# Patient Record
Sex: FEMALE | Race: WHITE | Employment: FULL TIME | URBAN - METROPOLITAN AREA
[De-identification: names, ages, dates, MRNs, and addresses within clinical notes are randomized per-mention and may not be internally consistent; named-entity substitution may affect disease eponyms.]

---

## 2017-01-29 ENCOUNTER — OFFICE VISIT (OUTPATIENT)
Dept: FAMILY MEDICINE CLINIC | Facility: CLINIC | Age: 37
End: 2017-01-29

## 2017-01-29 VITALS
OXYGEN SATURATION: 99 % | RESPIRATION RATE: 15 BRPM | HEART RATE: 72 BPM | SYSTOLIC BLOOD PRESSURE: 102 MMHG | TEMPERATURE: 99 F | DIASTOLIC BLOOD PRESSURE: 64 MMHG

## 2017-01-29 DIAGNOSIS — R51.9 SCALP TENDERNESS: Primary | ICD-10-CM

## 2017-01-29 PROCEDURE — 99213 OFFICE O/P EST LOW 20 MIN: CPT | Performed by: PHYSICIAN ASSISTANT

## 2017-01-29 NOTE — PROGRESS NOTES
CHIEF COMPLAINT:     Patient presents with: Other: bump on back of  head. HPI:   Donald Daniel is a 29year old female who presents with complaints of tenderness and palpable bumps left occipital scalp for past 3 days.    She is concerned about poss conjunctiva clear, sclera white,  PERRLA  EARS: TM's non erythematous  NOSE: nares patent, mucosa without congestion  THROAT: Posterior pharynx is non erythematous, no PND, no exudates.   NECK: supple, non-tender  LUNGS: clear to auscultation bilaterally wi

## 2017-06-05 ENCOUNTER — TELEPHONE (OUTPATIENT)
Dept: FAMILY MEDICINE CLINIC | Facility: CLINIC | Age: 37
End: 2017-06-05

## 2017-06-05 DIAGNOSIS — Z00.00 LABORATORY EXAM ORDERED AS PART OF ROUTINE GENERAL MEDICAL EXAMINATION: ICD-10-CM

## 2017-06-05 DIAGNOSIS — E78.00 PURE HYPERCHOLESTEROLEMIA: Primary | ICD-10-CM

## 2017-06-20 ENCOUNTER — LAB ENCOUNTER (OUTPATIENT)
Dept: LAB | Age: 37
End: 2017-06-20
Attending: FAMILY MEDICINE
Payer: COMMERCIAL

## 2017-06-20 DIAGNOSIS — Z00.00 LABORATORY EXAM ORDERED AS PART OF ROUTINE GENERAL MEDICAL EXAMINATION: ICD-10-CM

## 2017-06-20 DIAGNOSIS — E78.00 PURE HYPERCHOLESTEROLEMIA: ICD-10-CM

## 2017-06-20 PROCEDURE — 80053 COMPREHEN METABOLIC PANEL: CPT | Performed by: FAMILY MEDICINE

## 2017-06-20 PROCEDURE — 80061 LIPID PANEL: CPT | Performed by: FAMILY MEDICINE

## 2017-06-20 PROCEDURE — 36415 COLL VENOUS BLD VENIPUNCTURE: CPT | Performed by: FAMILY MEDICINE

## 2017-06-20 PROCEDURE — 85025 COMPLETE CBC W/AUTO DIFF WBC: CPT | Performed by: FAMILY MEDICINE

## 2017-06-28 ENCOUNTER — OFFICE VISIT (OUTPATIENT)
Dept: FAMILY MEDICINE CLINIC | Facility: CLINIC | Age: 37
End: 2017-06-28

## 2017-06-28 ENCOUNTER — OFFICE VISIT (OUTPATIENT)
Dept: OBGYN CLINIC | Facility: CLINIC | Age: 37
End: 2017-06-28

## 2017-06-28 ENCOUNTER — APPOINTMENT (OUTPATIENT)
Dept: LAB | Age: 37
End: 2017-06-28
Attending: FAMILY MEDICINE
Payer: COMMERCIAL

## 2017-06-28 VITALS
HEART RATE: 80 BPM | HEIGHT: 68 IN | BODY MASS INDEX: 18.34 KG/M2 | DIASTOLIC BLOOD PRESSURE: 66 MMHG | WEIGHT: 121 LBS | SYSTOLIC BLOOD PRESSURE: 116 MMHG

## 2017-06-28 VITALS
RESPIRATION RATE: 16 BRPM | WEIGHT: 120 LBS | HEIGHT: 68.5 IN | TEMPERATURE: 99 F | DIASTOLIC BLOOD PRESSURE: 60 MMHG | SYSTOLIC BLOOD PRESSURE: 90 MMHG | BODY MASS INDEX: 17.98 KG/M2 | HEART RATE: 72 BPM

## 2017-06-28 DIAGNOSIS — Z00.00 ANNUAL PHYSICAL EXAM: Primary | ICD-10-CM

## 2017-06-28 DIAGNOSIS — L65.9 HAIR THINNING: ICD-10-CM

## 2017-06-28 DIAGNOSIS — K90.41 GLUTEN INTOLERANCE: ICD-10-CM

## 2017-06-28 DIAGNOSIS — N89.8 DISCHARGE FROM THE VAGINA: ICD-10-CM

## 2017-06-28 DIAGNOSIS — Z01.419 ENCOUNTER FOR WELL WOMAN EXAM WITH ROUTINE GYNECOLOGICAL EXAM: Primary | ICD-10-CM

## 2017-06-28 PROCEDURE — 87510 GARDNER VAG DNA DIR PROBE: CPT | Performed by: OBSTETRICS & GYNECOLOGY

## 2017-06-28 PROCEDURE — 88175 CYTOPATH C/V AUTO FLUID REDO: CPT | Performed by: OBSTETRICS & GYNECOLOGY

## 2017-06-28 PROCEDURE — 87660 TRICHOMONAS VAGIN DIR PROBE: CPT | Performed by: OBSTETRICS & GYNECOLOGY

## 2017-06-28 PROCEDURE — 99395 PREV VISIT EST AGE 18-39: CPT | Performed by: OBSTETRICS & GYNECOLOGY

## 2017-06-28 PROCEDURE — 87480 CANDIDA DNA DIR PROBE: CPT | Performed by: OBSTETRICS & GYNECOLOGY

## 2017-06-28 PROCEDURE — 99395 PREV VISIT EST AGE 18-39: CPT | Performed by: FAMILY MEDICINE

## 2017-06-28 NOTE — PROGRESS NOTES
Patient presents with:  Physical: well woman, no pap, sees gyn, pt had lab work completed     HPI:   Terence Quintana is a 29year old female who presents for a complete physical exam.  No specific concerns today.   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 : no. Children: none. Exercise: 5 times a week - cardio. .    Diet: gluten free. Healthy choices. Fruits, vegetables. REVIEW OF SYSTEMS:   All systems reviewed, negative, other than noted above.     EXAM:   BP

## 2017-06-28 NOTE — PROGRESS NOTES
Maryann Sullivan is a 29year old female [place] Patient's last menstrual period was 06/27/2017. Patient presents with:  Wellness Visit  .   C/o increased vaginal discharge  H/o chronic constipation, linzess samples given  OBSTETRICS HISTORY:  OB History   G Outpatient Prescriptions:   •  Multiple Vitamins-Calcium (ONE-A-DAY WOMENS FORMULA OR), Take  by mouth., Disp: , Rfl:     ALLERGIES:    Benzoyl Peroxide            Comment:Other reaction(s): Unknown  Gluten                    Peroxin A [Benzoyl * normal without masses or tenderness  Perineum: normal  Anus: no hemorroids     Assessment & Plan:  Diagnoses and all orders for this visit:    Encounter for well woman exam with routine gynecological exam  -     THINPREP PAP SMEAR B; Future  -     HPV HIGH

## 2017-11-08 ENCOUNTER — NURSE ONLY (OUTPATIENT)
Dept: OBGYN CLINIC | Facility: CLINIC | Age: 37
End: 2017-11-08

## 2017-11-08 DIAGNOSIS — N39.0 URINARY TRACT INFECTION WITHOUT HEMATURIA, SITE UNSPECIFIED: Primary | ICD-10-CM

## 2017-11-08 PROCEDURE — 81002 URINALYSIS NONAUTO W/O SCOPE: CPT | Performed by: OBSTETRICS & GYNECOLOGY

## 2017-11-08 PROCEDURE — 87086 URINE CULTURE/COLONY COUNT: CPT | Performed by: OBSTETRICS & GYNECOLOGY

## 2017-11-08 PROCEDURE — 99212 OFFICE O/P EST SF 10 MIN: CPT | Performed by: OBSTETRICS & GYNECOLOGY

## 2017-11-08 RX ORDER — NITROFURANTOIN 25; 75 MG/1; MG/1
100 CAPSULE ORAL 2 TIMES DAILY
Qty: 14 CAPSULE | Refills: 0 | Status: SHIPPED | OUTPATIENT
Start: 2017-11-08 | End: 2017-11-15

## 2018-05-25 ENCOUNTER — HOSPITAL ENCOUNTER (OUTPATIENT)
Age: 38
Discharge: HOME | End: 2018-05-25
Payer: COMMERCIAL

## 2018-05-25 VITALS
TEMPERATURE: 99 F | HEART RATE: 65 BPM | OXYGEN SATURATION: 100 % | SYSTOLIC BLOOD PRESSURE: 129 MMHG | DIASTOLIC BLOOD PRESSURE: 85 MMHG | RESPIRATION RATE: 16 BRPM

## 2018-05-25 DIAGNOSIS — J06.9 VIRAL UPPER RESPIRATORY ILLNESS: Primary | ICD-10-CM

## 2018-05-25 PROCEDURE — 99204 OFFICE O/P NEW MOD 45 MIN: CPT

## 2018-05-25 PROCEDURE — 99213 OFFICE O/P EST LOW 20 MIN: CPT

## 2018-05-25 RX ORDER — METHYLPREDNISOLONE 4 MG/1
TABLET ORAL
Qty: 1 PACKAGE | Refills: 0 | Status: SHIPPED | OUTPATIENT
Start: 2018-05-25 | End: 2018-07-21

## 2018-05-25 RX ORDER — AZITHROMYCIN 250 MG/1
TABLET, FILM COATED ORAL
Qty: 1 PACKAGE | Refills: 0 | Status: SHIPPED | OUTPATIENT
Start: 2018-05-25 | End: 2018-05-30

## 2018-05-25 RX ORDER — FLUTICASONE PROPIONATE 50 MCG
2 SPRAY, SUSPENSION (ML) NASAL DAILY
Qty: 16 G | Refills: 0 | Status: SHIPPED | OUTPATIENT
Start: 2018-05-25 | End: 2018-06-24

## 2018-05-25 RX ORDER — BENZONATATE 100 MG/1
100 CAPSULE ORAL 3 TIMES DAILY PRN
Qty: 30 CAPSULE | Refills: 0 | Status: SHIPPED | OUTPATIENT
Start: 2018-05-25 | End: 2018-06-24

## 2018-05-26 NOTE — ED PROVIDER NOTES
Patient Seen in: THE [facility]    History   Patient presents with:  Cough/URI  Sore Throat    Stated Complaint: Sore throat, cough x 4days    HPI    Patient is a pleasant 52-year-old female.   4 days prior to arrival, patient had on lymphadenopathy. Eye examination: EOMs are intact, normal conjunctival  ENT: There is fluid noted to the bilateral auditory canals without injection or loss of landmarks. Nasal mucosa within normal limits.   [place] [id number]  [phone]              Medications Prescribed:  Current Discharge Medication List    START taking these medications    benzonatate 100 MG Oral Cap  Take 1 capsule (100 mg total) by mouth 3 (three) times daily as needed for cough.   Qty: 30 capsule

## 2018-05-30 ENCOUNTER — OFFICE VISIT (OUTPATIENT)
Dept: FAMILY MEDICINE CLINIC | Facility: CLINIC | Age: 38
End: 2018-05-30

## 2018-05-30 VITALS
BODY MASS INDEX: 17.73 KG/M2 | SYSTOLIC BLOOD PRESSURE: 110 MMHG | HEIGHT: 68.25 IN | WEIGHT: 117 LBS | TEMPERATURE: 99 F | DIASTOLIC BLOOD PRESSURE: 70 MMHG | RESPIRATION RATE: 16 BRPM | HEART RATE: 80 BPM

## 2018-05-30 DIAGNOSIS — R05.9 COUGH: ICD-10-CM

## 2018-05-30 DIAGNOSIS — J02.9 SORE THROAT: Primary | ICD-10-CM

## 2018-05-30 PROCEDURE — 99213 OFFICE O/P EST LOW 20 MIN: CPT | Performed by: FAMILY MEDICINE

## 2018-05-30 PROCEDURE — 87880 STREP A ASSAY W/OPTIC: CPT | Performed by: FAMILY MEDICINE

## 2018-05-30 RX ORDER — AMOXICILLIN AND CLAVULANATE POTASSIUM 875; 125 MG/1; MG/1
1 TABLET, FILM COATED ORAL 2 TIMES DAILY
Qty: 14 TABLET | Refills: 0 | Status: SHIPPED | OUTPATIENT
Start: 2018-05-30 | End: 2018-06-06

## 2018-05-30 NOTE — PROGRESS NOTES
Patient presents with:  Cough  Sore Throat     HPI:   Jamey Begum is a 40year old female who presents to the office for a week of a tickle in her throat, progressed to sore throat and cough. When symptoms started, she began dayquil, nyquil, soup.

## 2018-07-11 ENCOUNTER — TELEPHONE (OUTPATIENT)
Dept: FAMILY MEDICINE CLINIC | Facility: CLINIC | Age: 38
End: 2018-07-11

## 2018-07-11 DIAGNOSIS — Z00.00 LABORATORY EXAM ORDERED AS PART OF ROUTINE GENERAL MEDICAL EXAMINATION: ICD-10-CM

## 2018-07-11 DIAGNOSIS — E78.00 PURE HYPERCHOLESTEROLEMIA: Primary | ICD-10-CM

## 2018-07-21 ENCOUNTER — OFFICE VISIT (OUTPATIENT)
Dept: OBGYN CLINIC | Facility: CLINIC | Age: 38
End: 2018-07-21
Payer: COMMERCIAL

## 2018-07-21 ENCOUNTER — APPOINTMENT (OUTPATIENT)
Dept: LAB | Age: 38
End: 2018-07-21
Attending: FAMILY MEDICINE
Payer: COMMERCIAL

## 2018-07-21 VITALS
HEIGHT: 68.25 IN | WEIGHT: 118 LBS | SYSTOLIC BLOOD PRESSURE: 104 MMHG | RESPIRATION RATE: 16 BRPM | DIASTOLIC BLOOD PRESSURE: 60 MMHG | BODY MASS INDEX: 17.88 KG/M2 | HEART RATE: 72 BPM

## 2018-07-21 DIAGNOSIS — Z00.00 LABORATORY EXAM ORDERED AS PART OF ROUTINE GENERAL MEDICAL EXAMINATION: ICD-10-CM

## 2018-07-21 DIAGNOSIS — E78.00 PURE HYPERCHOLESTEROLEMIA: ICD-10-CM

## 2018-07-21 DIAGNOSIS — Z01.419 ENCOUNTER FOR WELL WOMAN EXAM WITH ROUTINE GYNECOLOGICAL EXAM: Primary | ICD-10-CM

## 2018-07-21 DIAGNOSIS — Z12.4 CERVICAL CANCER SCREENING: ICD-10-CM

## 2018-07-21 LAB
ALBUMIN SERPL-MCNC: 4 G/DL (ref 3.5–4.8)
ALBUMIN/GLOB SERPL: 1.1 {RATIO} (ref 1–2)
ALP LIVER SERPL-CCNC: 46 U/L (ref 37–98)
ALT SERPL-CCNC: 18 U/L (ref 14–54)
ANION GAP SERPL CALC-SCNC: 7 MMOL/L (ref 0–18)
AST SERPL-CCNC: 15 U/L (ref 15–41)
BILIRUB SERPL-MCNC: 1.2 MG/DL (ref 0.1–2)
BUN BLD-MCNC: 14 MG/DL (ref 8–20)
BUN/CREAT SERPL: 16.7 (ref 10–20)
CALCIUM BLD-MCNC: 9 MG/DL (ref 8.3–10.3)
CHLORIDE SERPL-SCNC: 106 MMOL/L (ref 101–111)
CHOLEST SMN-MCNC: 200 MG/DL (ref ?–200)
CO2 SERPL-SCNC: 27 MMOL/L (ref 22–32)
CREAT BLD-MCNC: 0.84 MG/DL (ref 0.55–1.02)
ERYTHROCYTE [DISTWIDTH] IN BLOOD BY AUTOMATED COUNT: 12.5 % (ref 11.5–16)
GLOBULIN PLAS-MCNC: 3.5 G/DL (ref 2.5–3.7)
GLUCOSE BLD-MCNC: 90 MG/DL (ref 70–99)
HCT VFR BLD AUTO: 41.5 % (ref 34–50)
HDLC SERPL-MCNC: 82 MG/DL (ref 45–?)
HDLC SERPL: 2.44 {RATIO} (ref ?–4.44)
HGB BLD-MCNC: 14 G/DL (ref 12–16)
LDLC SERPL CALC-MCNC: 102 MG/DL (ref ?–130)
M PROTEIN MFR SERPL ELPH: 7.5 G/DL (ref 6.1–8.3)
MCH RBC QN AUTO: 32.1 PG (ref 27–33.2)
MCHC RBC AUTO-ENTMCNC: 33.7 G/DL (ref 31–37)
MCV RBC AUTO: 95.2 FL (ref 81–100)
NONHDLC SERPL-MCNC: 118 MG/DL (ref ?–130)
OSMOLALITY SERPL CALC.SUM OF ELEC: 290 MOSM/KG (ref 275–295)
PLATELET # BLD AUTO: 205 10(3)UL (ref 150–450)
POTASSIUM SERPL-SCNC: 4.1 MMOL/L (ref 3.6–5.1)
RBC # BLD AUTO: 4.36 X10(6)UL (ref 3.8–5.1)
RED CELL DISTRIBUTION WIDTH-SD: 43.6 FL (ref 35.1–46.3)
SODIUM SERPL-SCNC: 140 MMOL/L (ref 136–144)
TRIGL SERPL-MCNC: 82 MG/DL (ref ?–150)
VLDLC SERPL CALC-MCNC: 16 MG/DL (ref 5–40)
WBC # BLD AUTO: 6.5 X10(3) UL (ref 4–13)

## 2018-07-21 PROCEDURE — 88175 CYTOPATH C/V AUTO FLUID REDO: CPT | Performed by: OBSTETRICS & GYNECOLOGY

## 2018-07-21 PROCEDURE — 36415 COLL VENOUS BLD VENIPUNCTURE: CPT | Performed by: FAMILY MEDICINE

## 2018-07-21 PROCEDURE — 99395 PREV VISIT EST AGE 18-39: CPT | Performed by: OBSTETRICS & GYNECOLOGY

## 2018-07-21 PROCEDURE — 85027 COMPLETE CBC AUTOMATED: CPT | Performed by: FAMILY MEDICINE

## 2018-07-21 PROCEDURE — 87624 HPV HI-RISK TYP POOLED RSLT: CPT | Performed by: OBSTETRICS & GYNECOLOGY

## 2018-07-21 PROCEDURE — 80061 LIPID PANEL: CPT | Performed by: FAMILY MEDICINE

## 2018-07-21 PROCEDURE — 80053 COMPREHEN METABOLIC PANEL: CPT | Performed by: FAMILY MEDICINE

## 2018-07-21 NOTE — PROGRESS NOTES
Natalie Jarvis is a 40year old female [place] Patient's last menstrual period was 06/27/2018 (approximate).  Patient presents with:  Wellness Visit: annual.  .No complaints, patien desires fertility when meets a man, discussed egg preservation    OBSTETRI Maternal Aunt      Breast       MEDICATIONS:    Current Outpatient Prescriptions:   •  Multiple Vitamins-Calcium (ONE-A-DAY WOMENS FORMULA OR), Take  by mouth., Disp: , Rfl:     ALLERGIES:    Benzoyl Peroxide            Comment:Other reaction(s): Unknown contour, position, mobility, without tenderness  Adnexa: normal without masses or tenderness  Perineum: normal  Anus: no hemorroids     Assessment & Plan:  Diagnoses and all orders for this visit:    Encounter for well woman exam with routine gynecological

## 2018-07-23 LAB — HPV I/H RISK 1 DNA SPEC QL NAA+PROBE: NEGATIVE

## 2018-08-01 ENCOUNTER — OFFICE VISIT (OUTPATIENT)
Dept: FAMILY MEDICINE CLINIC | Facility: CLINIC | Age: 38
End: 2018-08-01
Payer: COMMERCIAL

## 2018-08-01 VITALS
SYSTOLIC BLOOD PRESSURE: 120 MMHG | HEART RATE: 68 BPM | WEIGHT: 119.63 LBS | DIASTOLIC BLOOD PRESSURE: 60 MMHG | TEMPERATURE: 98 F | BODY MASS INDEX: 17.92 KG/M2 | RESPIRATION RATE: 12 BRPM | HEIGHT: 68.5 IN

## 2018-08-01 DIAGNOSIS — L65.9 HAIR THINNING: ICD-10-CM

## 2018-08-01 DIAGNOSIS — Z00.00 ANNUAL PHYSICAL EXAM: Primary | ICD-10-CM

## 2018-08-01 PROCEDURE — 99395 PREV VISIT EST AGE 18-39: CPT | Performed by: FAMILY MEDICINE

## 2018-08-01 NOTE — PROGRESS NOTES
Patient presents with:  Physical: no pap      HPI:   Cara Cardenas is a 40year old female who presents for a complete physical exam.      Last pap: 7/21/2018 with OBGYN . Normal and neg HPV. Last mammogram: at 37. Breast exams with OBGYN.    Colonosco week    Occ:  for [email or web address] : no. Children: no.  Exercise: cardio on a regular basis - 5 times a week. Diet: gluten free, healthy. REVIEW OF SYSTEMS:   All systems reviewed, negative, other than noted above.     EXAM:

## 2019-11-04 ENCOUNTER — OFFICE VISIT (OUTPATIENT)
Dept: OBGYN CLINIC | Facility: CLINIC | Age: 39
End: 2019-11-04
Payer: COMMERCIAL

## 2019-11-04 VITALS
DIASTOLIC BLOOD PRESSURE: 56 MMHG | BODY MASS INDEX: 17.68 KG/M2 | HEIGHT: 68.5 IN | SYSTOLIC BLOOD PRESSURE: 115 MMHG | HEART RATE: 88 BPM | WEIGHT: 118 LBS

## 2019-11-04 DIAGNOSIS — Z12.4 CERVICAL CANCER SCREENING: ICD-10-CM

## 2019-11-04 DIAGNOSIS — Z01.419 ENCOUNTER FOR WELL WOMAN EXAM WITH ROUTINE GYNECOLOGICAL EXAM: Primary | ICD-10-CM

## 2019-11-04 PROCEDURE — 88175 CYTOPATH C/V AUTO FLUID REDO: CPT | Performed by: OBSTETRICS & GYNECOLOGY

## 2019-11-04 PROCEDURE — 87624 HPV HI-RISK TYP POOLED RSLT: CPT | Performed by: OBSTETRICS & GYNECOLOGY

## 2019-11-04 PROCEDURE — 99395 PREV VISIT EST AGE 18-39: CPT | Performed by: OBSTETRICS & GYNECOLOGY

## 2019-11-04 NOTE — PROGRESS NOTES
Natalie Jarvis is a 46year old female [facility] Patient's last menstrual period was 10/15/2019 (exact date). Patient presents with:  Wellness Visit  . Patient has no complaints, declines b.c.   Patient moved back from [facility] Attends meetings of clubs or organizations: Not on file        Relationship status: Not on file      Intimate partner violence:        Fear of current or ex partner: Not on file        Emotionally abused: Not on file        Physically abused: Not on file vision  Cardiovascular:  denies chest pain or palpitations  Respiratory:  denies shortness of breath  Gastrointestinal:  denies heartburn, abdominal pain, diarrhea or constipation  Genitourinary:  denies dysuria, incontinence, abnormal vaginal discharge, v

## 2020-02-20 ENCOUNTER — NURSE ONLY (OUTPATIENT)
Dept: OBGYN CLINIC | Facility: CLINIC | Age: 40
End: 2020-02-20
Payer: COMMERCIAL

## 2020-02-20 VITALS — HEIGHT: 68.5 IN | WEIGHT: 118 LBS | BODY MASS INDEX: 17.68 KG/M2

## 2020-02-20 DIAGNOSIS — R35.0 URINARY FREQUENCY: Primary | ICD-10-CM

## 2020-02-20 LAB
MULTISTIX LOT#: ABNORMAL NUMERIC
NITRITE, URINE: POSITIVE
PH, URINE: 7.5 (ref 4.5–8)
SPECIFIC GRAVITY: 1.02 (ref 1–1.03)
URINE-COLOR: YELLOW
UROBILINOGEN,SEMI-QN: 2 MG/DL (ref 0–1.9)

## 2020-02-20 PROCEDURE — 87086 URINE CULTURE/COLONY COUNT: CPT | Performed by: OBSTETRICS & GYNECOLOGY

## 2020-02-20 PROCEDURE — 87186 SC STD MICRODIL/AGAR DIL: CPT | Performed by: OBSTETRICS & GYNECOLOGY

## 2020-02-20 PROCEDURE — 81002 URINALYSIS NONAUTO W/O SCOPE: CPT | Performed by: OBSTETRICS & GYNECOLOGY

## 2020-02-20 PROCEDURE — 87088 URINE BACTERIA CULTURE: CPT | Performed by: OBSTETRICS & GYNECOLOGY

## 2020-02-20 RX ORDER — NITROFURANTOIN 25; 75 MG/1; MG/1
100 CAPSULE ORAL 2 TIMES DAILY
Qty: 14 CAPSULE | Refills: 0 | Status: SHIPPED | OUTPATIENT
Start: 2020-02-20 | End: 2020-07-14

## 2020-02-24 NOTE — PROGRESS NOTES
Patient aware of results and recommendations to continue Macrobid and drink plenty of fluids. Patient verbalizes understanding.

## 2020-02-27 ENCOUNTER — TELEPHONE (OUTPATIENT)
Dept: FAMILY MEDICINE CLINIC | Facility: CLINIC | Age: 40
End: 2020-02-27

## 2020-02-27 DIAGNOSIS — Z00.00 ROUTINE GENERAL MEDICAL EXAMINATION AT A HEALTH CARE FACILITY: Primary | ICD-10-CM

## 2020-02-27 NOTE — TELEPHONE ENCOUNTER
Please enter lab orders for the patient's upcoming physical appointment. Physical scheduled:    Your appointments     Date & Time Appointment Department [facility])    Mar 16, 2020 12:30 PM CDT Physical - Established with Adelaida Harding MD [address] [place] [address]

## 2020-03-09 ENCOUNTER — LAB ENCOUNTER (OUTPATIENT)
Dept: LAB | Age: 40
End: 2020-03-09
Attending: FAMILY MEDICINE
Payer: COMMERCIAL

## 2020-03-09 DIAGNOSIS — Z00.00 ROUTINE GENERAL MEDICAL EXAMINATION AT A HEALTH CARE FACILITY: ICD-10-CM

## 2020-03-09 LAB
ALBUMIN SERPL-MCNC: 3.9 G/DL (ref 3.4–5)
ALBUMIN/GLOB SERPL: 1.2 {RATIO} (ref 1–2)
ALP LIVER SERPL-CCNC: 45 U/L (ref 37–98)
ALT SERPL-CCNC: 21 U/L (ref 13–56)
ANION GAP SERPL CALC-SCNC: 4 MMOL/L (ref 0–18)
AST SERPL-CCNC: 14 U/L (ref 15–37)
BILIRUB SERPL-MCNC: 0.7 MG/DL (ref 0.1–2)
BUN BLD-MCNC: 15 MG/DL (ref 7–18)
BUN/CREAT SERPL: 17.2 (ref 10–20)
CALCIUM BLD-MCNC: 8.7 MG/DL (ref 8.5–10.1)
CHLORIDE SERPL-SCNC: 108 MMOL/L (ref 98–112)
CHOLEST SMN-MCNC: 206 MG/DL (ref ?–200)
CO2 SERPL-SCNC: 29 MMOL/L (ref 21–32)
CREAT BLD-MCNC: 0.87 MG/DL (ref 0.55–1.02)
DEPRECATED RDW RBC AUTO: 46.1 FL (ref 35.1–46.3)
ERYTHROCYTE [DISTWIDTH] IN BLOOD BY AUTOMATED COUNT: 12.6 % (ref 11–15)
GLOBULIN PLAS-MCNC: 3.3 G/DL (ref 2.8–4.4)
GLUCOSE BLD-MCNC: 90 MG/DL (ref 70–99)
HCT VFR BLD AUTO: 42.3 % (ref 35–48)
HDLC SERPL-MCNC: 79 MG/DL (ref 40–59)
HGB BLD-MCNC: 13.7 G/DL (ref 12–16)
LDLC SERPL CALC-MCNC: 113 MG/DL (ref ?–100)
M PROTEIN MFR SERPL ELPH: 7.2 G/DL (ref 6.4–8.2)
MCH RBC QN AUTO: 32.3 PG (ref 26–34)
MCHC RBC AUTO-ENTMCNC: 32.4 G/DL (ref 31–37)
MCV RBC AUTO: 99.8 FL (ref 80–100)
NONHDLC SERPL-MCNC: 127 MG/DL (ref ?–130)
OSMOLALITY SERPL CALC.SUM OF ELEC: 292 MOSM/KG (ref 275–295)
PATIENT FASTING Y/N/NP: YES
PATIENT FASTING Y/N/NP: YES
PLATELET # BLD AUTO: 222 10(3)UL (ref 150–450)
POTASSIUM SERPL-SCNC: 4 MMOL/L (ref 3.5–5.1)
RBC # BLD AUTO: 4.24 X10(6)UL (ref 3.8–5.3)
SODIUM SERPL-SCNC: 141 MMOL/L (ref 136–145)
TRIGL SERPL-MCNC: 72 MG/DL (ref 30–149)
TSI SER-ACNC: 2.28 MIU/ML (ref 0.36–3.74)
VLDLC SERPL CALC-MCNC: 14 MG/DL (ref 0–30)
WBC # BLD AUTO: 3.7 X10(3) UL (ref 4–11)

## 2020-03-09 PROCEDURE — 85027 COMPLETE CBC AUTOMATED: CPT

## 2020-03-09 PROCEDURE — 84443 ASSAY THYROID STIM HORMONE: CPT

## 2020-03-09 PROCEDURE — 80061 LIPID PANEL: CPT

## 2020-03-09 PROCEDURE — 36415 COLL VENOUS BLD VENIPUNCTURE: CPT

## 2020-03-09 PROCEDURE — 80053 COMPREHEN METABOLIC PANEL: CPT

## 2020-03-16 ENCOUNTER — TELEPHONE (OUTPATIENT)
Dept: FAMILY MEDICINE CLINIC | Facility: CLINIC | Age: 40
End: 2020-03-16

## 2020-03-31 ENCOUNTER — TELEPHONE (OUTPATIENT)
Dept: FAMILY MEDICINE CLINIC | Facility: CLINIC | Age: 40
End: 2020-03-31

## 2020-03-31 NOTE — TELEPHONE ENCOUNTER
Patient faxed over screening form to be completed by Dr Jim Matt. Then faxed to [phone]. Placed in Triage.

## 2020-06-03 ENCOUNTER — TELEPHONE (OUTPATIENT)
Dept: FAMILY MEDICINE CLINIC | Facility: CLINIC | Age: 40
End: 2020-06-03

## 2020-07-14 ENCOUNTER — OFFICE VISIT (OUTPATIENT)
Dept: FAMILY MEDICINE CLINIC | Facility: CLINIC | Age: 40
End: 2020-07-14
Payer: COMMERCIAL

## 2020-07-14 VITALS
SYSTOLIC BLOOD PRESSURE: 98 MMHG | RESPIRATION RATE: 16 BRPM | TEMPERATURE: 98 F | BODY MASS INDEX: 17.73 KG/M2 | HEIGHT: 68 IN | DIASTOLIC BLOOD PRESSURE: 60 MMHG | HEART RATE: 72 BPM | WEIGHT: 117 LBS

## 2020-07-14 DIAGNOSIS — Z00.00 ROUTINE GENERAL MEDICAL EXAMINATION AT A HEALTH CARE FACILITY: Primary | ICD-10-CM

## 2020-07-14 PROCEDURE — 99395 PREV VISIT EST AGE 18-39: CPT | Performed by: FAMILY MEDICINE

## 2020-07-14 NOTE — PROGRESS NOTES
HPI:   Trisha Herron is a 46year old female who presents for a complete physical exam.   Last pap:   with GYN  Last mammogram:  No previous   Menses:  regular  Contraception:  none  Previous colonoscopy:  No previous  Family hx of breast, ovarian, cervi for cervical cancer screening 04/16,03/15,07/14,06/13,06/12    negative      No past surgical history on file.    Family History   Problem Relation Age of Onset   • Heart Disorder Mother         mitral valve prolapse   • Lipids Mother    • Other (Other) Mot good BS's,no masses, HSM or tenderness  :deferred  EXTREMITIES: no edema    ASSESSMENT AND PLAN:   Yolis Huntley is a 46year old female who presents for a complete physical exam.  Routine general medical examination at a health care facility  (primary

## 2020-08-04 ENCOUNTER — OFFICE VISIT (OUTPATIENT)
Dept: OBGYN CLINIC | Facility: CLINIC | Age: 40
End: 2020-08-04
Payer: COMMERCIAL

## 2020-08-04 ENCOUNTER — LAB ENCOUNTER (OUTPATIENT)
Dept: LAB | Facility: HOSPITAL | Age: 40
End: 2020-08-04
Attending: SPECIALIST
Payer: COMMERCIAL

## 2020-08-04 VITALS
BODY MASS INDEX: 17.58 KG/M2 | HEIGHT: 68 IN | WEIGHT: 116 LBS | SYSTOLIC BLOOD PRESSURE: 100 MMHG | HEART RATE: 70 BPM | RESPIRATION RATE: 16 BRPM | DIASTOLIC BLOOD PRESSURE: 76 MMHG

## 2020-08-04 DIAGNOSIS — Z11.3 SCREENING EXAMINATION FOR VENEREAL DISEASE: Primary | ICD-10-CM

## 2020-08-04 DIAGNOSIS — Z11.4 SCREENING FOR HUMAN IMMUNODEFICIENCY VIRUS: ICD-10-CM

## 2020-08-04 DIAGNOSIS — Z01.419 ENCOUNTER FOR WELL WOMAN EXAM WITH ROUTINE GYNECOLOGICAL EXAM: Primary | ICD-10-CM

## 2020-08-04 DIAGNOSIS — Z12.4 CERVICAL CANCER SCREENING: ICD-10-CM

## 2020-08-04 LAB
HBV CORE AB SERPL QL IA: NONREACTIVE
HBV SURFACE AG SER-ACNC: <0.1 [IU]/L
HBV SURFACE AG SERPL QL IA: NONREACTIVE
HCV AB SERPL QL IA: NONREACTIVE
T PALLIDUM AB SER QL IA: NONREACTIVE

## 2020-08-04 PROCEDURE — 86704 HEP B CORE ANTIBODY TOTAL: CPT

## 2020-08-04 PROCEDURE — 87625 HPV TYPES 16 & 18 ONLY: CPT | Performed by: OBSTETRICS & GYNECOLOGY

## 2020-08-04 PROCEDURE — 88175 CYTOPATH C/V AUTO FLUID REDO: CPT | Performed by: OBSTETRICS & GYNECOLOGY

## 2020-08-04 PROCEDURE — 87624 HPV HI-RISK TYP POOLED RSLT: CPT | Performed by: OBSTETRICS & GYNECOLOGY

## 2020-08-04 PROCEDURE — 87340 HEPATITIS B SURFACE AG IA: CPT

## 2020-08-04 PROCEDURE — 3074F SYST BP LT 130 MM HG: CPT | Performed by: OBSTETRICS & GYNECOLOGY

## 2020-08-04 PROCEDURE — 3078F DIAST BP <80 MM HG: CPT | Performed by: OBSTETRICS & GYNECOLOGY

## 2020-08-04 PROCEDURE — 86780 TREPONEMA PALLIDUM: CPT

## 2020-08-04 PROCEDURE — 87389 HIV-1 AG W/HIV-1&-2 AB AG IA: CPT

## 2020-08-04 PROCEDURE — 86803 HEPATITIS C AB TEST: CPT

## 2020-08-04 PROCEDURE — 99395 PREV VISIT EST AGE 18-39: CPT | Performed by: OBSTETRICS & GYNECOLOGY

## 2020-08-04 PROCEDURE — 3008F BODY MASS INDEX DOCD: CPT | Performed by: OBSTETRICS & GYNECOLOGY

## 2020-08-04 PROCEDURE — 36415 COLL VENOUS BLD VENIPUNCTURE: CPT

## 2020-08-04 NOTE — PROGRESS NOTES
Fito Penaloza is a 46year old female [place] Patient's last menstrual period was 2020 (exact date).  Patient presents with:  Physical  .Patient has no complaints, will proceed with egg freezing    OBSTETRICS HISTORY:  OB History    Para Term or organizations: Not on file        Relationship status: Not on file      Intimate partner violence:        Fear of current or ex partner: Not on file        Emotionally abused: Not on file        Physically abused: Not on file        Forced sexual [place] or palpitations  Respiratory:  denies shortness of breath  Gastrointestinal:  denies heartburn, abdominal pain, diarrhea or constipation  Genitourinary:  denies dysuria, incontinence, abnormal vaginal discharge, vaginal itching  Musculoskeletal:  denies ba

## 2020-08-05 LAB — HPV I/H RISK 1 DNA SPEC QL NAA+PROBE: POSITIVE

## 2020-08-06 LAB
HPV16 DNA CVX QL PROBE+SIG AMP: NEGATIVE
HPV18 DNA CVX QL PROBE+SIG AMP: NEGATIVE

## 2020-08-11 NOTE — PROGRESS NOTES
Patient aware of pap smear results and recommendations for repap in 1 year.  Patient verbalizes understanding

## 2021-05-11 ENCOUNTER — TELEPHONE (OUTPATIENT)
Dept: OBGYN CLINIC | Facility: CLINIC | Age: 41
End: 2021-05-11

## 2021-05-11 ENCOUNTER — TELEPHONE (OUTPATIENT)
Dept: FAMILY MEDICINE CLINIC | Facility: CLINIC | Age: 41
End: 2021-05-11

## 2021-05-11 DIAGNOSIS — Z00.00 ROUTINE GENERAL MEDICAL EXAMINATION AT A HEALTH CARE FACILITY: Primary | ICD-10-CM

## 2021-05-11 NOTE — TELEPHONE ENCOUNTER
Please enter lab orders for the patient's upcoming physical appointment. Physical scheduled:    Your appointments     Date & Time Appointment Department [facility])    Jun 22, 2021 12:30 PM CDT Physical - Established with Nicki Gallardo MD [facility]

## 2021-05-21 NOTE — TELEPHONE ENCOUNTER
Patient is schedule to come for well visit and wants to check with her insurance if it's cover. Patient is requesting a CPT code/charge that will be used.  Informed patient there are usually additional testing associated with the visit such as pap smear/HPV

## 2021-05-28 NOTE — TELEPHONE ENCOUNTER
Spoke with patient and provided code [id number] per info provided by Tiago Dewey. This was covered last year and is for  WWE unless other testing/procedures. Patient advised her last visit was in August of 2020 so it may not be covered in June.  Has verified wit

## 2021-06-16 ENCOUNTER — LAB ENCOUNTER (OUTPATIENT)
Dept: LAB | Age: 41
End: 2021-06-16
Attending: FAMILY MEDICINE
Payer: COMMERCIAL

## 2021-06-16 DIAGNOSIS — Z00.00 ROUTINE GENERAL MEDICAL EXAMINATION AT A HEALTH CARE FACILITY: ICD-10-CM

## 2021-06-16 PROCEDURE — 84443 ASSAY THYROID STIM HORMONE: CPT

## 2021-06-16 PROCEDURE — 80061 LIPID PANEL: CPT

## 2021-06-16 PROCEDURE — 85027 COMPLETE CBC AUTOMATED: CPT

## 2021-06-16 PROCEDURE — 36415 COLL VENOUS BLD VENIPUNCTURE: CPT

## 2021-06-16 PROCEDURE — 80053 COMPREHEN METABOLIC PANEL: CPT

## 2021-06-22 ENCOUNTER — OFFICE VISIT (OUTPATIENT)
Dept: FAMILY MEDICINE CLINIC | Facility: CLINIC | Age: 41
End: 2021-06-22
Payer: COMMERCIAL

## 2021-06-22 VITALS
DIASTOLIC BLOOD PRESSURE: 70 MMHG | RESPIRATION RATE: 16 BRPM | TEMPERATURE: 99 F | HEART RATE: 68 BPM | HEIGHT: 68 IN | WEIGHT: 117 LBS | BODY MASS INDEX: 17.73 KG/M2 | SYSTOLIC BLOOD PRESSURE: 94 MMHG

## 2021-06-22 DIAGNOSIS — Z00.00 ROUTINE GENERAL MEDICAL EXAMINATION AT A HEALTH CARE FACILITY: Primary | ICD-10-CM

## 2021-06-22 PROCEDURE — 3078F DIAST BP <80 MM HG: CPT | Performed by: FAMILY MEDICINE

## 2021-06-22 PROCEDURE — 3074F SYST BP LT 130 MM HG: CPT | Performed by: FAMILY MEDICINE

## 2021-06-22 PROCEDURE — 99395 PREV VISIT EST AGE 18-39: CPT | Performed by: FAMILY MEDICINE

## 2021-06-22 PROCEDURE — 3008F BODY MASS INDEX DOCD: CPT | Performed by: FAMILY MEDICINE

## 2021-06-22 NOTE — PROGRESS NOTES
HPI:   Michael Thompson is a 45year old female who presents for a complete physical exam.   Last pap:   with GYN.   Tested positive for HPV last year, neg 16/18/45  Last mammogram:  No previous   Menses:  regular  Contraception:  none  Previous colonosc atypical nevus 2014   • IBS    • Pap smear for cervical cancer screening 04/16,03/15,07/14,06/13,06/12    negative      No past surgical history on file.    Family History   Problem Relation Age of Onset   • Heart Disorder Mother         mitral valve prolap auscultation  CARDIO: RRR without murmur  GI: good BS's,no masses, HSM or tenderness  :deferred  EXTREMITIES: no edema    ASSESSMENT AND PLAN:   Selene Sainz is a 45year old female who presents for a complete physical exam.  Routine general medic

## 2021-06-24 ENCOUNTER — HOSPITAL ENCOUNTER (OUTPATIENT)
Age: 41
Discharge: HOME | End: 2021-06-24
Payer: COMMERCIAL

## 2021-06-24 VITALS
SYSTOLIC BLOOD PRESSURE: 136 MMHG | RESPIRATION RATE: 16 BRPM | HEART RATE: 64 BPM | WEIGHT: 120 LBS | TEMPERATURE: 99 F | DIASTOLIC BLOOD PRESSURE: 76 MMHG | OXYGEN SATURATION: 100 % | BODY MASS INDEX: 18.19 KG/M2 | HEIGHT: 68 IN

## 2021-06-24 DIAGNOSIS — M25.561 ACUTE PAIN OF RIGHT KNEE: Primary | ICD-10-CM

## 2021-06-24 PROCEDURE — 99213 OFFICE O/P EST LOW 20 MIN: CPT

## 2021-06-24 PROCEDURE — 99202 OFFICE O/P NEW SF 15 MIN: CPT

## 2021-06-24 NOTE — ED PROVIDER NOTES
Patient Seen in: [facility]      History   Patient presents with:  Leg or Foot Injury    Stated Complaint: right knee pain,injury    HPI/Subjective:   HPI    71-year-old female presents to the [address] [place] [address] with right knee pain since yesterday.   Prisca Piper Pulmonary:      Effort: Pulmonary effort is normal.   Musculoskeletal:      Cervical back: Normal range of motion and neck supple. Right knee: No swelling, deformity, effusion, erythema, ecchymosis or bony tenderness. Normal range of motion.  No tend

## 2021-06-24 NOTE — ED INITIAL ASSESSMENT (HPI)
Pt aox4. Pt c/o rt knee pain with weight bearing started early this am. Pt states noticed rt knee soreness after using the eliptical x 1 hour yesterday. Pt denies injury.

## 2021-06-28 ENCOUNTER — OFFICE VISIT (OUTPATIENT)
Dept: OBGYN CLINIC | Facility: CLINIC | Age: 41
End: 2021-06-28
Payer: COMMERCIAL

## 2021-06-28 VITALS
RESPIRATION RATE: 14 BRPM | BODY MASS INDEX: 17.43 KG/M2 | HEIGHT: 68 IN | SYSTOLIC BLOOD PRESSURE: 92 MMHG | DIASTOLIC BLOOD PRESSURE: 60 MMHG | WEIGHT: 115 LBS | HEART RATE: 67 BPM

## 2021-06-28 DIAGNOSIS — Z01.419 ENCOUNTER FOR WELL WOMAN EXAM WITH ROUTINE GYNECOLOGICAL EXAM: Primary | ICD-10-CM

## 2021-06-28 DIAGNOSIS — Z12.31 BREAST CANCER SCREENING BY MAMMOGRAM: ICD-10-CM

## 2021-06-28 DIAGNOSIS — Z12.4 CERVICAL CANCER SCREENING: ICD-10-CM

## 2021-06-28 PROCEDURE — 3074F SYST BP LT 130 MM HG: CPT | Performed by: OBSTETRICS & GYNECOLOGY

## 2021-06-28 PROCEDURE — 99395 PREV VISIT EST AGE 18-39: CPT | Performed by: OBSTETRICS & GYNECOLOGY

## 2021-06-28 PROCEDURE — 3078F DIAST BP <80 MM HG: CPT | Performed by: OBSTETRICS & GYNECOLOGY

## 2021-06-28 PROCEDURE — 88175 CYTOPATH C/V AUTO FLUID REDO: CPT | Performed by: OBSTETRICS & GYNECOLOGY

## 2021-06-28 PROCEDURE — 87624 HPV HI-RISK TYP POOLED RSLT: CPT | Performed by: OBSTETRICS & GYNECOLOGY

## 2021-06-28 PROCEDURE — 3008F BODY MASS INDEX DOCD: CPT | Performed by: OBSTETRICS & GYNECOLOGY

## 2021-06-28 NOTE — PROGRESS NOTES
Corinne Bence is a 45year old female [facility] Patient's last menstrual period was 06/19/2021. No chief complaint on file.   .Patient is considering getting pregnant, will use her frozen eggs  Last pap smear was negative but positive HPV, discussed col Self-Exams: Yes    Social History Narrative      Not on file    Social Determinants of Health  Financial Resource Strain:       Difficulty of Paying Living Expenses:   Food Insecurity:       Worried About [address] in the Last Year:       [employer] Systems:  Constitutional:  Denies fatigue, night sweats, hot flashes  Eyes:  denies blurred or double vision  Cardiovascular:  denies chest pain or palpitations  Respiratory:  denies shortness of breath  Gastrointestinal:  denies heartburn, abdominal pain, COLLECTION; Future  -     THINPREP PAP SMEAR B; Future    Breast cancer screening by mammogram  -     [facility] SYD 2D+3D SCREENING BILAT (CPT=77067/53672);  Future

## 2021-07-07 NOTE — PROGRESS NOTES
Per your note on 6/30 you wanted her to have colpo. Do you still want pt to have colpo? Please advise.

## 2022-02-09 ENCOUNTER — HOSPITAL ENCOUNTER (OUTPATIENT)
Dept: MAMMOGRAPHY | Age: 42
Discharge: HOME | End: 2022-02-09
Attending: OBSTETRICS & GYNECOLOGY
Payer: COMMERCIAL

## 2022-02-09 DIAGNOSIS — Z12.31 BREAST CANCER SCREENING BY MAMMOGRAM: ICD-10-CM

## 2022-02-09 PROCEDURE — 77067 SCR MAMMO BI INCL CAD: CPT | Performed by: OBSTETRICS & GYNECOLOGY

## 2022-02-09 PROCEDURE — 77063 BREAST TOMOSYNTHESIS BI: CPT | Performed by: OBSTETRICS & GYNECOLOGY

## 2022-10-10 ENCOUNTER — TELEPHONE (OUTPATIENT)
Dept: FAMILY MEDICINE CLINIC | Facility: CLINIC | Age: 42
End: 2022-10-10

## 2022-10-10 DIAGNOSIS — Z12.31 VISIT FOR SCREENING MAMMOGRAM: ICD-10-CM

## 2022-10-10 DIAGNOSIS — Z00.00 LABORATORY EXAMINATION ORDERED AS PART OF A ROUTINE GENERAL MEDICAL EXAMINATION: ICD-10-CM

## 2022-10-10 NOTE — TELEPHONE ENCOUNTER
Please enter lab orders for the patient's upcoming physical appointment. Physical scheduled: Your appointments     Date & Time Appointment Department [facility])    Oct 26, 2022 11:00 AM CDT Physical - Established with Julisa Hutton  (Extension Rachana Mclean)        Nov 16, 2022  4:00 PM CST Physical - Established with Ruthy Huitron MD [facility] 26, [address], [place]  ([address])            [address] Floor [facility] [address] [place] [id number] 9297 [facility], [address], [facility] [address] Reinier Harrington . [phone]         Preferred lab: [address] [facility])     The patient has been notified to complete fasting labs prior to their physical appointment.

## 2022-10-11 NOTE — TELEPHONE ENCOUNTER
1. Visit for screening mammogram  -     [facility] SCREENING BILAT (CPT=77067); Future; Expected date: 10/10/2022  2. Laboratory examination ordered as part of a routine general medical examination  -     Comp Metabolic Panel (14); Future; Expected date: 10/10/2022  -     Lipid Panel; Future; Expected date: 10/10/2022  -     TSH W Reflex To Free T4; Future; Expected date: 10/10/2022  -     CBC, Platelet; No Differential; Future; Expected date: 10/10/2022       OK to notify.  Thanks, Josr Atkinson MD

## 2022-10-26 ENCOUNTER — OFFICE VISIT (OUTPATIENT)
Dept: OBGYN CLINIC | Facility: CLINIC | Age: 42
End: 2022-10-26
Payer: COMMERCIAL

## 2022-10-26 VITALS
WEIGHT: 115 LBS | BODY MASS INDEX: 17.43 KG/M2 | SYSTOLIC BLOOD PRESSURE: 103 MMHG | HEIGHT: 68 IN | DIASTOLIC BLOOD PRESSURE: 60 MMHG | HEART RATE: 70 BPM

## 2022-10-26 DIAGNOSIS — Z12.4 CERVICAL CANCER SCREENING: ICD-10-CM

## 2022-10-26 DIAGNOSIS — Z01.419 ENCOUNTER FOR WELL WOMAN EXAM WITH ROUTINE GYNECOLOGICAL EXAM: Primary | ICD-10-CM

## 2022-10-26 DIAGNOSIS — Z12.31 BREAST CANCER SCREENING BY MAMMOGRAM: ICD-10-CM

## 2022-10-26 PROCEDURE — 99396 PREV VISIT EST AGE 40-64: CPT | Performed by: OBSTETRICS & GYNECOLOGY

## 2022-10-26 PROCEDURE — 87624 HPV HI-RISK TYP POOLED RSLT: CPT | Performed by: OBSTETRICS & GYNECOLOGY

## 2022-10-26 PROCEDURE — 3074F SYST BP LT 130 MM HG: CPT | Performed by: OBSTETRICS & GYNECOLOGY

## 2022-10-26 PROCEDURE — 3078F DIAST BP <80 MM HG: CPT | Performed by: OBSTETRICS & GYNECOLOGY

## 2022-10-26 PROCEDURE — 3008F BODY MASS INDEX DOCD: CPT | Performed by: OBSTETRICS & GYNECOLOGY

## 2022-10-27 LAB — HPV I/H RISK 1 DNA SPEC QL NAA+PROBE: NEGATIVE

## 2022-11-28 ENCOUNTER — LAB ENCOUNTER (OUTPATIENT)
Dept: LAB | Age: 42
End: 2022-11-28
Attending: FAMILY MEDICINE
Payer: COMMERCIAL

## 2022-11-28 DIAGNOSIS — Z00.00 LABORATORY EXAMINATION ORDERED AS PART OF A ROUTINE GENERAL MEDICAL EXAMINATION: ICD-10-CM

## 2022-11-28 LAB
ALBUMIN SERPL-MCNC: 4 G/DL (ref 3.4–5)
ALBUMIN/GLOB SERPL: 1.3 {RATIO} (ref 1–2)
ALP LIVER SERPL-CCNC: 48 U/L
ALT SERPL-CCNC: 23 U/L
ANION GAP SERPL CALC-SCNC: 4 MMOL/L (ref 0–18)
AST SERPL-CCNC: 11 U/L (ref 15–37)
BILIRUB SERPL-MCNC: 1 MG/DL (ref 0.1–2)
BUN BLD-MCNC: 19 MG/DL (ref 7–18)
CALCIUM BLD-MCNC: 9 MG/DL (ref 8.5–10.1)
CHLORIDE SERPL-SCNC: 105 MMOL/L (ref 98–112)
CHOLEST SERPL-MCNC: 218 MG/DL (ref ?–200)
CO2 SERPL-SCNC: 29 MMOL/L (ref 21–32)
CREAT BLD-MCNC: 0.87 MG/DL
ERYTHROCYTE [DISTWIDTH] IN BLOOD BY AUTOMATED COUNT: 12.4 %
FASTING PATIENT LIPID ANSWER: YES
FASTING STATUS PATIENT QL REPORTED: YES
GFR SERPLBLD BASED ON 1.73 SQ M-ARVRAT: 86 ML/MIN/1.73M2 (ref 60–?)
GLOBULIN PLAS-MCNC: 3.2 G/DL (ref 2.8–4.4)
GLUCOSE BLD-MCNC: 86 MG/DL (ref 70–99)
HCT VFR BLD AUTO: 41.9 %
HDLC SERPL-MCNC: 78 MG/DL (ref 40–59)
HGB BLD-MCNC: 13.9 G/DL
LDLC SERPL CALC-MCNC: 130 MG/DL (ref ?–100)
MCH RBC QN AUTO: 32.3 PG (ref 26–34)
MCHC RBC AUTO-ENTMCNC: 33.2 G/DL (ref 31–37)
MCV RBC AUTO: 97.2 FL
NONHDLC SERPL-MCNC: 140 MG/DL (ref ?–130)
OSMOLALITY SERPL CALC.SUM OF ELEC: 288 MOSM/KG (ref 275–295)
PLATELET # BLD AUTO: 255 10(3)UL (ref 150–450)
POTASSIUM SERPL-SCNC: 4.4 MMOL/L (ref 3.5–5.1)
PROT SERPL-MCNC: 7.2 G/DL (ref 6.4–8.2)
RBC # BLD AUTO: 4.31 X10(6)UL
SODIUM SERPL-SCNC: 138 MMOL/L (ref 136–145)
TRIGL SERPL-MCNC: 56 MG/DL (ref 30–149)
TSI SER-ACNC: 1.65 MIU/ML (ref 0.36–3.74)
VLDLC SERPL CALC-MCNC: 10 MG/DL (ref 0–30)
WBC # BLD AUTO: 4 X10(3) UL (ref 4–11)

## 2022-11-28 PROCEDURE — 85027 COMPLETE CBC AUTOMATED: CPT

## 2022-11-28 PROCEDURE — 36415 COLL VENOUS BLD VENIPUNCTURE: CPT

## 2022-11-28 PROCEDURE — 84443 ASSAY THYROID STIM HORMONE: CPT

## 2022-11-28 PROCEDURE — 80061 LIPID PANEL: CPT

## 2022-11-28 PROCEDURE — 80053 COMPREHEN METABOLIC PANEL: CPT

## 2022-12-02 ENCOUNTER — OFFICE VISIT (OUTPATIENT)
Dept: FAMILY MEDICINE CLINIC | Facility: CLINIC | Age: 42
End: 2022-12-02
Payer: COMMERCIAL

## 2022-12-02 VITALS
RESPIRATION RATE: 18 BRPM | SYSTOLIC BLOOD PRESSURE: 96 MMHG | HEART RATE: 78 BPM | HEIGHT: 68 IN | WEIGHT: 115.63 LBS | BODY MASS INDEX: 17.52 KG/M2 | DIASTOLIC BLOOD PRESSURE: 60 MMHG

## 2022-12-02 DIAGNOSIS — Z00.00 ANNUAL PHYSICAL EXAM: Primary | ICD-10-CM

## 2022-12-02 DIAGNOSIS — E78.00 PURE HYPERCHOLESTEROLEMIA: ICD-10-CM

## 2022-12-02 PROCEDURE — 3008F BODY MASS INDEX DOCD: CPT | Performed by: FAMILY MEDICINE

## 2022-12-02 PROCEDURE — 3078F DIAST BP <80 MM HG: CPT | Performed by: FAMILY MEDICINE

## 2022-12-02 PROCEDURE — 3074F SYST BP LT 130 MM HG: CPT | Performed by: FAMILY MEDICINE

## 2022-12-02 PROCEDURE — 99396 PREV VISIT EST AGE 40-64: CPT | Performed by: FAMILY MEDICINE

## 2023-03-29 ENCOUNTER — HOSPITAL ENCOUNTER (OUTPATIENT)
Dept: MAMMOGRAPHY | Age: 43
Discharge: HOME | End: 2023-03-29
Attending: OBSTETRICS & GYNECOLOGY
Payer: COMMERCIAL

## 2023-03-29 DIAGNOSIS — Z12.31 BREAST CANCER SCREENING BY MAMMOGRAM: ICD-10-CM

## 2023-03-29 PROCEDURE — 77067 SCR MAMMO BI INCL CAD: CPT | Performed by: OBSTETRICS & GYNECOLOGY

## 2023-03-29 PROCEDURE — 77063 BREAST TOMOSYNTHESIS BI: CPT | Performed by: OBSTETRICS & GYNECOLOGY

## 2023-04-12 ENCOUNTER — OFFICE VISIT (OUTPATIENT)
Facility: CLINIC | Age: 43
End: 2023-04-12
Payer: COMMERCIAL

## 2023-04-12 VITALS
WEIGHT: 113 LBS | DIASTOLIC BLOOD PRESSURE: 82 MMHG | SYSTOLIC BLOOD PRESSURE: 100 MMHG | HEIGHT: 68 IN | BODY MASS INDEX: 17.13 KG/M2 | HEART RATE: 89 BPM

## 2023-04-12 DIAGNOSIS — Z31.83 PATIENT UNDERGOING IN VITRO FERTILIZATION: ICD-10-CM

## 2023-04-12 DIAGNOSIS — Z11.3 ROUTINE SCREENING FOR STI (SEXUALLY TRANSMITTED INFECTION): Primary | ICD-10-CM

## 2023-04-12 PROCEDURE — 87591 N.GONORRHOEAE DNA AMP PROB: CPT

## 2023-04-12 PROCEDURE — 87491 CHLMYD TRACH DNA AMP PROBE: CPT

## 2023-04-12 PROCEDURE — 3079F DIAST BP 80-89 MM HG: CPT

## 2023-04-12 PROCEDURE — 3074F SYST BP LT 130 MM HG: CPT

## 2023-04-12 PROCEDURE — 3008F BODY MASS INDEX DOCD: CPT

## 2023-04-12 PROCEDURE — 99212 OFFICE O/P EST SF 10 MIN: CPT

## 2023-04-13 LAB
C TRACH DNA SPEC QL NAA+PROBE: NEGATIVE
N GONORRHOEA DNA SPEC QL NAA+PROBE: NEGATIVE

## 2024-01-11 ENCOUNTER — TELEPHONE (OUTPATIENT)
Dept: FAMILY MEDICINE CLINIC | Facility: CLINIC | Age: 44
End: 2024-01-11

## 2024-01-11 DIAGNOSIS — Z00.00 ROUTINE GENERAL MEDICAL EXAMINATION AT A HEALTH CARE FACILITY: Primary | ICD-10-CM

## 2024-01-11 DIAGNOSIS — Z12.31 SCREENING MAMMOGRAM FOR BREAST CANCER: Primary | ICD-10-CM

## 2024-01-11 NOTE — TELEPHONE ENCOUNTER
Please enter lab orders for the patient's upcoming physical appointment.     Physical scheduled:   Your appointments       Date & Time Appointment Department ([place])    Jan 18, 2024  9:00 AM CST Physical - Established with Clarisse Peterson DO [facility] ([facility])        Jan 23, 2024  2:00 PM CST Physical - Established with Sherry Tapia MD [facility] ([facility])              [facility]  [address]  [facility] [id number]  [phone] [facility]  [facility]  [address]  [facility] [id number] [phone]           Preferred lab: [facility] ([facility])     The patient has been notified to complete fasting labs prior to their physical appointment.

## 2024-01-15 ENCOUNTER — LAB ENCOUNTER (OUTPATIENT)
Dept: LAB | Age: 44
End: 2024-01-15
Attending: FAMILY MEDICINE
Payer: COMMERCIAL

## 2024-01-15 DIAGNOSIS — Z00.00 ROUTINE GENERAL MEDICAL EXAMINATION AT A HEALTH CARE FACILITY: ICD-10-CM

## 2024-01-15 LAB
ALBUMIN SERPL-MCNC: 4 G/DL (ref 3.4–5)
ALBUMIN/GLOB SERPL: 1.3 {RATIO} (ref 1–2)
ALP LIVER SERPL-CCNC: 45 U/L
ALT SERPL-CCNC: 21 U/L
ANION GAP SERPL CALC-SCNC: 2 MMOL/L (ref 0–18)
AST SERPL-CCNC: 15 U/L (ref 15–37)
BILIRUB SERPL-MCNC: 1 MG/DL (ref 0.1–2)
BUN BLD-MCNC: 20 MG/DL (ref 9–23)
CALCIUM BLD-MCNC: 8.9 MG/DL (ref 8.5–10.1)
CHLORIDE SERPL-SCNC: 110 MMOL/L (ref 98–112)
CHOLEST SERPL-MCNC: 209 MG/DL (ref ?–200)
CO2 SERPL-SCNC: 29 MMOL/L (ref 21–32)
CREAT BLD-MCNC: 0.74 MG/DL
EGFRCR SERPLBLD CKD-EPI 2021: 104 ML/MIN/1.73M2 (ref 60–?)
ERYTHROCYTE [DISTWIDTH] IN BLOOD BY AUTOMATED COUNT: 12.6 %
FASTING PATIENT LIPID ANSWER: YES
FASTING STATUS PATIENT QL REPORTED: YES
GLOBULIN PLAS-MCNC: 3.1 G/DL (ref 2.8–4.4)
GLUCOSE BLD-MCNC: 91 MG/DL (ref 70–99)
HCT VFR BLD AUTO: 41.8 %
HDLC SERPL-MCNC: 91 MG/DL (ref 40–59)
HGB BLD-MCNC: 14.1 G/DL
LDLC SERPL CALC-MCNC: 108 MG/DL (ref ?–100)
MCH RBC QN AUTO: 32 PG (ref 26–34)
MCHC RBC AUTO-ENTMCNC: 33.7 G/DL (ref 31–37)
MCV RBC AUTO: 94.8 FL
NONHDLC SERPL-MCNC: 118 MG/DL (ref ?–130)
OSMOLALITY SERPL CALC.SUM OF ELEC: 294 MOSM/KG (ref 275–295)
PLATELET # BLD AUTO: 217 10(3)UL (ref 150–450)
POTASSIUM SERPL-SCNC: 4.1 MMOL/L (ref 3.5–5.1)
PROT SERPL-MCNC: 7.1 G/DL (ref 6.4–8.2)
RBC # BLD AUTO: 4.41 X10(6)UL
SODIUM SERPL-SCNC: 141 MMOL/L (ref 136–145)
TRIGL SERPL-MCNC: 55 MG/DL (ref 30–149)
TSI SER-ACNC: 2.24 MIU/ML (ref 0.36–3.74)
VLDLC SERPL CALC-MCNC: 9 MG/DL (ref 0–30)
WBC # BLD AUTO: 3.9 X10(3) UL (ref 4–11)

## 2024-01-15 PROCEDURE — 85027 COMPLETE CBC AUTOMATED: CPT

## 2024-01-15 PROCEDURE — 80061 LIPID PANEL: CPT

## 2024-01-15 PROCEDURE — 80053 COMPREHEN METABOLIC PANEL: CPT

## 2024-01-15 PROCEDURE — 84443 ASSAY THYROID STIM HORMONE: CPT

## 2024-01-15 PROCEDURE — 36415 COLL VENOUS BLD VENIPUNCTURE: CPT

## 2024-01-18 ENCOUNTER — OFFICE VISIT (OUTPATIENT)
Facility: CLINIC | Age: 44
End: 2024-01-18
Payer: COMMERCIAL

## 2024-01-18 VITALS
HEART RATE: 70 BPM | BODY MASS INDEX: 17.43 KG/M2 | HEIGHT: 68 IN | SYSTOLIC BLOOD PRESSURE: 98 MMHG | DIASTOLIC BLOOD PRESSURE: 60 MMHG | WEIGHT: 115 LBS

## 2024-01-18 DIAGNOSIS — Z01.419 ENCOUNTER FOR WELL WOMAN EXAM WITH ROUTINE GYNECOLOGICAL EXAM: Primary | ICD-10-CM

## 2024-01-18 PROCEDURE — 3078F DIAST BP <80 MM HG: CPT | Performed by: OBSTETRICS & GYNECOLOGY

## 2024-01-18 PROCEDURE — 99396 PREV VISIT EST AGE 40-64: CPT | Performed by: OBSTETRICS & GYNECOLOGY

## 2024-01-18 PROCEDURE — 3008F BODY MASS INDEX DOCD: CPT | Performed by: OBSTETRICS & GYNECOLOGY

## 2024-01-18 PROCEDURE — 3074F SYST BP LT 130 MM HG: CPT | Performed by: OBSTETRICS & GYNECOLOGY

## 2024-01-18 RX ORDER — PRENATAL 168/IRON/FOLIC/OMEGA3 27-800-235
CAPSULE ORAL
COMMUNITY
Start: 2023-10-01

## 2024-01-18 RX ORDER — LEVOTHYROXINE SODIUM 0.03 MG/1
25 TABLET ORAL DAILY
COMMUNITY
Start: 2024-01-09

## 2024-01-18 NOTE — PROGRESS NOTES
Viktoria Philip is a 43 year old female  Patient's last menstrual period was 2024 (exact date).   Chief Complaint   Patient presents with    Wellness Visit    Other     Pt mentioned she is seeing IVF doctor.   .  Patient has no complaints, had HSG with xavier Benjamin discharge, will try IUI before IVF  OBSTETRICS HISTORY:  OB History    Para Term  AB Living   0 0 0 0 0 0   SAB IAB Ectopic Multiple Live Births   0 0 0 0 0       GYNE HISTORY:  Periods regular monthly    History   Sexual Activity    Sexual activity: Yes    Partners: Male        Pap Date: 10/26/22  Pap Result Notes: neg/neg        MEDICAL HISTORY:  Past Medical History:   Diagnosis Date    History of atypical nevus     IBS     Pap smear for cervical cancer screening ,03/15,,,    negative       SURGICAL HISTORY:  History reviewed. No pertinent surgical history.    SOCIAL HISTORY:  Social History     Socioeconomic History    Marital status: Single     Spouse name: Not on file    Number of children: Not on file    Years of education: Not on file    Highest education level: Not on file   Occupational History    Not on file   Tobacco Use    Smoking status: Never    Smokeless tobacco: Never   Vaping Use    Vaping Use: Never used   Substance and Sexual Activity    Alcohol use: Yes     Alcohol/week: 0.0 standard drinks of alcohol     Comment: one or two a week    Drug use: No    Sexual activity: Yes     Partners: Male   Other Topics Concern     Service Not Asked    Blood Transfusions Not Asked    Caffeine Concern Yes     Comment: soda occassional    Occupational Exposure Not Asked    Hobby Hazards Not Asked    Sleep Concern Not Asked    Stress Concern Not Asked    Weight Concern Not Asked    Special Diet Not Asked    Back Care Not Asked    Exercise Yes     Comment: 5-6 times a week    Bike Helmet Not Asked    Seat Belt Yes    Self-Exams Yes   Social History Narrative    Not on file     Social  Determinants of Health     Financial Resource Strain: Not on file   Food Insecurity: Not on file   Transportation Needs: Not on file   Physical Activity: Not on file   Stress: Not on file   Social Connections: Not on file   Housing Stability: Not on file       FAMILY HISTORY:  Family History   Problem Relation Age of Onset    Breast Cancer Mother     Heart Disorder Mother         mitral valve prolapse    Lipids Mother     LCIS Mother 55    Other (Other) Mother     Lipids Father     Other (hyperlipidemia) Father     Thyroid Disorder Sister     Heart Disorder Brother         MVP, s/p ablation surgery?    Heart Disorder Maternal Grandfather         MI    Diabetes Maternal Grandfather     Other (Other) Maternal Grandfather     Stroke Paternal Grandmother     Skin cancer Paternal Grandfather         dx age unknown    Prostate Cancer Paternal Grandfather         dx age unknown    Breast Cancer Maternal Aunt         dx age 50-60's- no gene mutation    Breast Cancer 2nd occurrence Maternal Aunt 70       MEDICATIONS:    Current Outpatient Medications:     levothyroxine 25 MCG Oral Tab, Take 1 tablet (25 mcg total) by mouth daily., Disp: , Rfl:     Prenat-Fe Carbonyl-FA-Omega 3 (ONE-A-DAY WOMENS PRENATAL 1) 28-0.8-235 MG Oral Cap, , Disp: , Rfl:     ALLERGIES:    Allergies   Allergen Reactions    Gluten PAIN    Benzoyl Peroxide RASH     Other reaction(s): Unknown         Review of Systems:  Constitutional:  Denies fatigue, night sweats, hot flashes  Eyes:  denies blurred or double vision  Cardiovascular:  denies chest pain or palpitations  Respiratory:  denies shortness of breath  Gastrointestinal:  denies heartburn, abdominal pain, diarrhea or constipation  Genitourinary:  denies dysuria, incontinence, abnormal vaginal discharge, vaginal itching  Musculoskeletal:  denies back pain.  Skin/Breast:  Denies any breast pain, lumps, or discharge.   Neurological:  denies headaches, extremity weakness or numbness.  Psychiatric:  denies depression or anxiety.  Endocrine:   denies excessive thirst or urination.  Heme/Lymph:  denies history of anemia, easy bruising or bleeding.      PHYSICAL EXAM:   Constitutional: well developed, well nourished  Head/Face: normocephalic  Neck/Thyroid: thyroid symmetric, no thyromegaly, no nodules, no adenopathy  Lymphatic:no abnormal supraclavicular or axillary adenopathy is noted  Breast: normal without palpable masses, tenderness, asymmetry, nipple discharge, nipple retraction or skin changes  Abdomen:  soft, nontender, nondistended, no masses  Skin/Hair: no unusual rashes or bruises  Extremities: no edema, no cyanosis  Psychiatric:  Oriented to time, place, person and situation. Appropriate mood and affect    Pelvic Exam:  External Genitalia: normal appearance, hair distribution, and no lesions  Urethral Meatus:  normal in size, location, without lesions and prolapse  Bladder:  No fullness, masses or tenderness  Vagina:  Normal appearance without lesions, no abnormal discharge  Cervix:  Normal without tenderness on motion  Uterus: normal in size, contour, position, mobility, without tenderness  Adnexa: normal without masses or tenderness  Perineum: normal  Anus: no hemorroids     Assessment & Plan:  Diagnoses and all orders for this visit:    Encounter for well woman exam with routine gynecological exam  -     Cancel: Hpv High Risk , Thin Prep Collect; Future  -     Cancel: ThinPrep PAP Smear B; Future

## 2024-01-23 ENCOUNTER — OFFICE VISIT (OUTPATIENT)
Dept: FAMILY MEDICINE CLINIC | Facility: CLINIC | Age: 44
End: 2024-01-23
Payer: COMMERCIAL

## 2024-01-23 VITALS
RESPIRATION RATE: 16 BRPM | DIASTOLIC BLOOD PRESSURE: 70 MMHG | WEIGHT: 117.81 LBS | BODY MASS INDEX: 17.06 KG/M2 | TEMPERATURE: 98 F | HEIGHT: 69.5 IN | SYSTOLIC BLOOD PRESSURE: 100 MMHG | HEART RATE: 72 BPM

## 2024-01-23 DIAGNOSIS — Z00.00 ROUTINE GENERAL MEDICAL EXAMINATION AT A HEALTH CARE FACILITY: Primary | ICD-10-CM

## 2024-01-23 PROCEDURE — 3078F DIAST BP <80 MM HG: CPT | Performed by: FAMILY MEDICINE

## 2024-01-23 PROCEDURE — 99396 PREV VISIT EST AGE 40-64: CPT | Performed by: FAMILY MEDICINE

## 2024-01-23 PROCEDURE — 3008F BODY MASS INDEX DOCD: CPT | Performed by: FAMILY MEDICINE

## 2024-01-23 PROCEDURE — 3074F SYST BP LT 130 MM HG: CPT | Performed by: FAMILY MEDICINE

## 2024-01-23 NOTE — PROGRESS NOTES
HPI:   Viktoria Philip is a 43 year old female who presents for a complete physical exam.   Last pap:   10/2022 with GYN.    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.     Lab work acceptable.    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  No concerns.    Wt Readings from Last 6 Encounters:   01/23/24 117 lb 12.8 oz (53.4 kg)   01/18/24 115 lb (52.2 kg)   04/12/23 113 lb (51.3 kg)   12/02/22 115 lb 9.6 oz (52.4 kg)   10/26/22 115 lb (52.2 kg)   06/28/21 115 lb (52.2 kg)     Body mass index is 17.15 kg/m².     Cholesterol, Total (mg/dL)   Date Value   01/15/2024 209 (H)   11/28/2022 218 (H)   06/16/2021 200 (H)   08/11/2010 188   12/15/2008 195   10/23/2007 235 (H)     CHOLESTEROL (mg/dL)   Date Value   04/08/2014 187   03/27/2013 214 (H)     HDL Cholesterol (mg/dL)   Date Value   01/15/2024 91 (H)   11/28/2022 78 (H)   06/16/2021 89 (H)   08/11/2010 89   12/15/2008 84   10/23/2007 76 (H)     HDL CHOL (mg/dL)   Date Value   04/08/2014 81   03/27/2013 74     LDL Cholesterol Calc (mg/dL)   Date Value   08/11/2010 72   12/15/2008 83   10/23/2007 132 (H)     LDL Cholesterol (mg/dL)   Date Value   01/15/2024 108 (H)   11/28/2022 130 (H)   06/16/2021 101 (H)     LDL CHOLESTROL (mg/dL)   Date Value   04/08/2014 92   03/27/2013 122     Triglycerides (mg/dL)   Date Value   08/11/2010 134   12/15/2008 142   10/23/2007 135        Current Outpatient Medications   Medication Sig Dispense Refill    levothyroxine 25 MCG Oral Tab Take 1 tablet (25 mcg total) by mouth daily.      Prenat-Fe Carbonyl-FA-Omega 3 (ONE-A-DAY WOMENS PRENATAL 1) 28-0.8-235 MG Oral Cap          Patient Active Problem List   Diagnosis    Pure hypercholesterolemia    Chronic constipation    Gluten intolerance     Past Medical History:   Diagnosis Date    History of atypical nevus 2014    IBS     Pap smear for cervical cancer screening 04/16,03/15,07/14,06/13,06/12    negative      No past surgical history on file.   Family History   Problem Relation Age of Onset    Breast Cancer Mother     Heart Disorder Mother         mitral valve prolapse    Lipids Mother     LCIS Mother 55    Other (Other) Mother     Lipids Father     Other (hyperlipidemia) Father     Thyroid Disorder Sister     Heart Disorder Brother         MVP, s/p ablation surgery?    Heart Disorder Maternal Grandfather         MI    Diabetes Maternal Grandfather     Other (Other) Maternal Grandfather     Stroke Paternal Grandmother     Skin cancer Paternal Grandfather         dx age unknown    Prostate Cancer Paternal Grandfather         dx age unknown    Breast Cancer Maternal Aunt         dx age 50-60's- no gene mutation    Breast Cancer 2nd occurrence Maternal Aunt 70      Social History:   Social History     Socioeconomic History    Marital status: Single   Tobacco Use    Smoking status: Never    Smokeless tobacco: Never   Vaping Use    Vaping Use: Never used   Substance and Sexual Activity    Alcohol use: Yes     Alcohol/week: 0.0 standard drinks of alcohol     Comment: one or two a week    Drug use: No    Sexual activity: Yes     Partners: Male   Other Topics Concern    Caffeine Concern Yes     Comment: soda occassional    Exercise Yes     Comment: 5-6 times a week    Seat Belt Yes    Self-Exams Yes        REVIEW OF SYSTEMS:   GENERAL: feels well otherwise  SKIN: denies any unusual skin lesions  EYES:no vision problems  LUNGS: denies shortness of breath  CARDIOVASCULAR: denies chest pain  GI: denies abdominal pain,  No constipation or diarrhea  : denies dysuria, vaginal discharge or itching  NEURO: denies headaches  PSYCHE: denies  depression or anxiety    EXAM:   /70   Pulse 72   Temp 98.3 °F (36.8 °C)   Resp 16   Ht 5' 9.5\" (1.765 m)   Wt 117 lb 12.8 oz (53.4 kg)   LMP 01/07/2024 (Exact Date)   BMI 17.15 kg/m²   Body mass index is 17.15 kg/m².   GENERAL: well developed, well nourished,in no apparent distress  SKIN: no rashes,no suspicious lesions  HEENT: atraumatic, normocephalic,throat are clear; dentition good  EYES:PERRLA, EOMI,conjunctiva are clear  NECK: supple,no adenopathy  BREAST:deferred  LUNGS: clear to auscultation  CARDIO: RRR without murmur  GI: good BS's,no masses, HSM or tenderness  :deferred  EXTREMITIES: no edema    ASSESSMENT AND PLAN:   Viktoria Philip is a 43 year old female who presents for a complete physical exam.  Encounter Diagnosis   Name Primary?    Routine general medical examination at a health care facility Yes     Pap and breast exams per GYN.   Labs yearly  Vaccines recommended today:  none  Recommended low fat diet and regular aerobic exercise.    The patient is asked to return for CPX in 1 year or before as needed.  No orders of the defined types were placed in this encounter.      Meds & Refills for this Visit:  Requested Prescriptions      No prescriptions requested or ordered in this encounter       Imaging & Consults:  None

## 2024-03-30 ENCOUNTER — HOSPITAL ENCOUNTER (OUTPATIENT)
Dept: MAMMOGRAPHY | Age: 44
Discharge: HOME | End: 2024-03-30
Attending: FAMILY MEDICINE
Payer: COMMERCIAL

## 2024-03-30 DIAGNOSIS — Z12.31 SCREENING MAMMOGRAM FOR BREAST CANCER: ICD-10-CM

## 2024-03-30 PROCEDURE — 77067 SCR MAMMO BI INCL CAD: CPT | Performed by: FAMILY MEDICINE

## 2024-03-30 PROCEDURE — 77063 BREAST TOMOSYNTHESIS BI: CPT | Performed by: FAMILY MEDICINE

## 2024-04-03 ENCOUNTER — TELEPHONE (OUTPATIENT)
Dept: FAMILY MEDICINE CLINIC | Facility: CLINIC | Age: 44
End: 2024-04-03

## 2024-04-03 ENCOUNTER — TELEPHONE (OUTPATIENT)
Facility: CLINIC | Age: 44
End: 2024-04-03

## 2024-04-03 NOTE — TELEPHONE ENCOUNTER
Pt needs a letter put into Mychart stating that the mammo was normal and no additional testing is needed so that she can take it to the IVF clinic

## 2024-04-03 NOTE — TELEPHONE ENCOUNTER
Pt calling requesting possible note from EP for IVF, states she had a mammogram done and just needs a note from EP stating additional testing is not needed. Pt states she has received something like this from EP before? Please advise and call pt when able.

## 2024-04-05 ENCOUNTER — OFFICE VISIT (OUTPATIENT)
Dept: OBGYN CLINIC | Facility: CLINIC | Age: 44
End: 2024-04-05
Payer: COMMERCIAL

## 2024-04-05 VITALS
HEIGHT: 69.5 IN | BODY MASS INDEX: 16.92 KG/M2 | HEART RATE: 65 BPM | SYSTOLIC BLOOD PRESSURE: 119 MMHG | WEIGHT: 116.88 LBS | DIASTOLIC BLOOD PRESSURE: 77 MMHG

## 2024-04-05 DIAGNOSIS — Z11.3 SCREENING EXAMINATION FOR STI: Primary | ICD-10-CM

## 2024-04-05 DIAGNOSIS — Z31.9 INFERTILITY MANAGEMENT: ICD-10-CM

## 2024-04-05 DIAGNOSIS — Z31.83 ENCOUNTER FOR ASSISTED REPRODUCTIVE FERTILITY CYCLE: ICD-10-CM

## 2024-04-05 PROCEDURE — 87491 CHLMYD TRACH DNA AMP PROBE: CPT

## 2024-04-05 PROCEDURE — 99212 OFFICE O/P EST SF 10 MIN: CPT

## 2024-04-05 PROCEDURE — 87591 N.GONORRHOEAE DNA AMP PROB: CPT

## 2024-04-05 NOTE — PROGRESS NOTES
Viktoria Philip is a 43 year old female  Patient's last menstrual period was 2024 (within days).   Chief Complaint   Patient presents with    Gyn Problem     Needs GC/Chlam testing for IVF    .    OBSTETRICS HISTORY:  OB History    Para Term  AB Living   0 0 0 0 0 0   SAB IAB Ectopic Multiple Live Births   0 0 0 0 0       GYNE HISTORY:     History   Sexual Activity    Sexual activity: Yes    Partners: Male                 MEDICAL HISTORY:  Past Medical History:   Diagnosis Date    History of atypical nevus     IBS     Pap smear for cervical cancer screening ,03/15,,,    negative       SURGICAL HISTORY:  History reviewed. No pertinent surgical history.    SOCIAL HISTORY:  Social History     Socioeconomic History    Marital status: Single     Spouse name: Not on file    Number of children: Not on file    Years of education: Not on file    Highest education level: Not on file   Occupational History    Not on file   Tobacco Use    Smoking status: Never    Smokeless tobacco: Never   Vaping Use    Vaping Use: Never used   Substance and Sexual Activity    Alcohol use: Yes     Alcohol/week: 0.0 standard drinks of alcohol     Comment: one or two a week    Drug use: No    Sexual activity: Yes     Partners: Male   Other Topics Concern     Service Not Asked    Blood Transfusions Not Asked    Caffeine Concern Yes     Comment: soda occassional    Occupational Exposure Not Asked    Hobby Hazards Not Asked    Sleep Concern Not Asked    Stress Concern Not Asked    Weight Concern Not Asked    Special Diet Not Asked    Back Care Not Asked    Exercise Yes     Comment: 5-6 times a week    Bike Helmet Not Asked    Seat Belt Yes    Self-Exams Yes   Social History Narrative    Not on file     Social Determinants of Health     Financial Resource Strain: Not on file   Food Insecurity: Not on file   Transportation Needs: Not on file   Physical Activity: Not on file   Stress: Not  on file   Social Connections: Not on file   Housing Stability: Not on file       FAMILY HISTORY:  Family History   Problem Relation Age of Onset    Breast Cancer Mother 68    Heart Disorder Mother         mitral valve prolapse    Lipids Mother     LCIS Mother 55    Other (Other) Mother     Lipids Father     Other (hyperlipidemia) Father     Thyroid Disorder Sister     Heart Disorder Brother         MVP, s/p ablation surgery?    Heart Disorder Maternal Grandfather         MI    Diabetes Maternal Grandfather     Other (Other) Maternal Grandfather     Stroke Paternal Grandmother     Skin cancer Paternal Grandfather         dx age unknown    Prostate Cancer Paternal Grandfather         dx age unknown    Breast Cancer Maternal Aunt         dx age 50-60's- no gene mutation    Breast Cancer 2nd occurrence Maternal Aunt 70       MEDICATIONS:    Current Outpatient Medications:     levothyroxine 25 MCG Oral Tab, Take 1 tablet (25 mcg total) by mouth daily., Disp: , Rfl:     Prenat-Fe Carbonyl-FA-Omega 3 (ONE-A-DAY WOMENS PRENATAL 1) 28-0.8-235 MG Oral Cap, , Disp: , Rfl:     ALLERGIES:    Allergies   Allergen Reactions    Gluten PAIN    Benzoyl Peroxide RASH     Other reaction(s): Unknown         PHYSICAL EXAM:   Pelvic Exam:  External Genitalia: normal appearance, hair distribution, and no lesions  Urethral Meatus:  normal in size, location, without lesions and prolapse  Bladder:  No fullness, masses or tenderness  Vagina:  Normal appearance without lesions, no abnormal discharge  Cervix:  Normal without tenderness on motion  Perineum: normal  Anus: no hemorroids     Assessment & Plan:  1. Screening examination for STI    - Chlamydia/Gc Amplification; Future    2. Infertility management    - Chlamydia/Gc Amplification; Future    3. Encounter for assisted reproductive fertility cycle    - Chlamydia/Gc Amplification; Future

## 2024-04-08 LAB
C TRACH DNA SPEC QL NAA+PROBE: NEGATIVE
N GONORRHOEA DNA SPEC QL NAA+PROBE: NEGATIVE

## 2024-04-11 NOTE — TELEPHONE ENCOUNTER
Pt will f/u with her PCP.Patient verbalized understanding, agreed to and intend to comply with plan of care.

## 2024-11-16 ENCOUNTER — APPOINTMENT (OUTPATIENT)
Dept: ULTRASOUND IMAGING | Age: 44
End: 2024-11-16
Attending: EMERGENCY MEDICINE
Payer: COMMERCIAL

## 2024-11-16 ENCOUNTER — HOSPITAL ENCOUNTER (EMERGENCY)
Age: 44
Discharge: HOME | End: 2024-11-16
Attending: EMERGENCY MEDICINE
Payer: COMMERCIAL

## 2024-11-16 VITALS
SYSTOLIC BLOOD PRESSURE: 112 MMHG | DIASTOLIC BLOOD PRESSURE: 59 MMHG | BODY MASS INDEX: 18.19 KG/M2 | WEIGHT: 120 LBS | RESPIRATION RATE: 17 BRPM | OXYGEN SATURATION: 97 % | TEMPERATURE: 100 F | HEART RATE: 57 BPM | HEIGHT: 68 IN

## 2024-11-16 DIAGNOSIS — O03.9 MISCARRIAGE (HCC): Primary | ICD-10-CM

## 2024-11-16 LAB
ANTIBODY SCREEN: NEGATIVE
B-HCG SERPL-ACNC: ABNORMAL MIU/ML
BASOPHILS # BLD AUTO: 0.06 X10(3) UL (ref 0–0.2)
BASOPHILS NFR BLD AUTO: 0.4 %
EOSINOPHIL # BLD AUTO: 0.02 X10(3) UL (ref 0–0.7)
EOSINOPHIL NFR BLD AUTO: 0.1 %
ERYTHROCYTE [DISTWIDTH] IN BLOOD BY AUTOMATED COUNT: 12.4 %
HCT VFR BLD AUTO: 40.4 %
HGB BLD-MCNC: 14.3 G/DL
IMM GRANULOCYTES # BLD AUTO: 0.05 X10(3) UL (ref 0–1)
IMM GRANULOCYTES NFR BLD: 0.4 %
LYMPHOCYTES # BLD AUTO: 0.68 X10(3) UL (ref 1–4)
LYMPHOCYTES NFR BLD AUTO: 5 %
MCH RBC QN AUTO: 33 PG (ref 26–34)
MCHC RBC AUTO-ENTMCNC: 35.4 G/DL (ref 31–37)
MCV RBC AUTO: 93.3 FL
MONOCYTES # BLD AUTO: 0.45 X10(3) UL (ref 0.1–1)
MONOCYTES NFR BLD AUTO: 3.3 %
NEUTROPHILS # BLD AUTO: 12.25 X10 (3) UL (ref 1.5–7.7)
NEUTROPHILS # BLD AUTO: 12.25 X10(3) UL (ref 1.5–7.7)
NEUTROPHILS NFR BLD AUTO: 90.8 %
PLATELET # BLD AUTO: 256 10(3)UL (ref 150–450)
RBC # BLD AUTO: 4.33 X10(6)UL
RH BLOOD TYPE: NEGATIVE
WBC # BLD AUTO: 13.5 X10(3) UL (ref 4–11)

## 2024-11-16 PROCEDURE — 96361 HYDRATE IV INFUSION ADD-ON: CPT

## 2024-11-16 PROCEDURE — 99285 EMERGENCY DEPT VISIT HI MDM: CPT

## 2024-11-16 PROCEDURE — 86900 BLOOD TYPING SEROLOGIC ABO: CPT | Performed by: EMERGENCY MEDICINE

## 2024-11-16 PROCEDURE — 86901 BLOOD TYPING SEROLOGIC RH(D): CPT | Performed by: EMERGENCY MEDICINE

## 2024-11-16 PROCEDURE — 76801 OB US < 14 WKS SINGLE FETUS: CPT | Performed by: EMERGENCY MEDICINE

## 2024-11-16 PROCEDURE — 96365 THER/PROPH/DIAG IV INF INIT: CPT

## 2024-11-16 PROCEDURE — 84702 CHORIONIC GONADOTROPIN TEST: CPT | Performed by: EMERGENCY MEDICINE

## 2024-11-16 PROCEDURE — 85025 COMPLETE CBC W/AUTO DIFF WBC: CPT | Performed by: EMERGENCY MEDICINE

## 2024-11-16 PROCEDURE — 76817 TRANSVAGINAL US OBSTETRIC: CPT | Performed by: EMERGENCY MEDICINE

## 2024-11-16 PROCEDURE — 86850 RBC ANTIBODY SCREEN: CPT | Performed by: EMERGENCY MEDICINE

## 2024-11-16 RX ORDER — PROGESTERONE 100 MG/1
50 CAPSULE ORAL 2 TIMES DAILY
COMMUNITY

## 2024-11-16 NOTE — ED INITIAL ASSESSMENT (HPI)
6weeks preg - was bleeding on Thursday - was seen at ob - had US - states US was ok - increased bleeding today with clots

## 2024-11-16 NOTE — ED PROVIDER NOTES
Patient Seen in: [facility] [place]      History     Chief Complaint   Patient presents with    Eval-G     Stated Complaint: eval g - 6weeks preg bleeding with clots    Subjective:   HPI      This is a 44-year-old female G1, P0, in vitro fertilization, currently 6 weeks pregnant followed by Dr. Mahin Martinez who presents with bleeding which began yesterday.  Apparently she was seen by OB and had ultrasound and was told everything was \"okay.  But today bleeding has increased due to this that she was told to come to the emergency room.  Today.  She states now it is more like an actual period and she has passed some clots.  She has had some cramping.  She presents with her  for further evaluation.    Objective:     Past Medical History:    History of atypical nevus    IBS    Pap smear for cervical cancer screening    negative              History reviewed. No pertinent surgical history.             Social History     Socioeconomic History    Marital status: Single   Tobacco Use    Smoking status: Never    Smokeless tobacco: Never   Vaping Use    Vaping status: Never Used   Substance and Sexual Activity    Alcohol use: Yes     Alcohol/week: 0.0 standard drinks of alcohol     Comment: one or two a week    Drug use: No    Sexual activity: Yes     Partners: Male   Other Topics Concern    Caffeine Concern Yes     Comment: soda occassional    Exercise Yes     Comment: 5-6 times a week    Seat Belt Yes    Self-Exams Yes                  Physical Exam     ED Triage Vitals [11/16/24 1431]   /79   Pulse 97   Resp 15   Temp 98.2 °F (36.8 °C)   Temp src Temporal   SpO2 100 %   O2 Device None (Room air)       Current Vitals:   Vital Signs  BP: 109/71  Pulse: 68  Resp: 18  Temp: 99.3 °F (37.4 °C)  Temp src: Temporal    Oxygen Therapy  SpO2: 99 %  O2 Device: None (Room air)        Physical Exam  GENERAL: Awake, alert oriented x3, nontoxic appearing.   SKIN: Normal, warm, and dry.  HEENT:  Pupils  equally round and reactive to light. Conjuctiva clear.  Oropharynx is clear and moist.   Lungs: Clear to auscultation bilaterally with no rales, no retractions, and no wheezing.  HEART:  Regular rate and rhythm. S1 and S2. No murmurs, no rubs or gallops.   ABDOMEN: Soft, nontender and nondistended. Normoactive bowel sounds. No rebound. No guarding.   EXTREMITIES: Warm with brisk capillary refill.         ED Course     Labs Reviewed   CBC WITH DIFFERENTIAL WITH PLATELET - Abnormal; Notable for the following components:       Result Value    WBC 13.5 (*)     Neutrophil Absolute Prelim 12.25 (*)     Neutrophil Absolute 12.25 (*)     Lymphocyte Absolute 0.68 (*)     All other components within normal limits   HCG, BETA SUBUNIT (QUANT PREGNANCY TEST) - Abnormal; Notable for the following components:    Hcg Quantitative 39,611.0 (*)     All other components within normal limits   ABORH (BLOOD TYPE)   RH IMMUNE GLOBULIN   ANTIBODY SCREEN   RH IMMUNE GLOBULIN          US PREG 1ST TRIM W/EV (CPT=76801/86343)    Result Date: 11/16/2024  PROCEDURE:  US OB W/ EV 1ST TRIMESTER (CPT=76801/59776)  COMPARISON:  None.  INDICATIONS:  eval g - 6weeks preg bleeding with clots  TECHNIQUE:  Transabdominal and endovaginal pelvic ultrasound examinations were performed.  PATIENT STATED HISTORY: (As transcribed by Technologist)  Patient stated heavy vaginal bleeding with clots, and pelvic cramping today.  Per IVF transfer 6 weeks 2 days pregnant.    MEASUREMENTS:  Left Ovary:                       2.75 cm x 1.51 cm x 2.12 cm Right Ovary:                     2.46 cm x 3.00 cm x 1.75 cm    FINDINGS:  GESTATIONAL SAC:  There is a oval-shaped gestational sac within the lower uterine segment with heterogeneous echotexture that has more the appearance of debris measuring 15 x 5 mm. FETAL POLE:  There is no fetal pole identified. YOLK SAC:  There is no yolk sac identified. CARDIAC ACTIVITY:  There is no cardiac activity. UTERUS:  The endometrium is  thickened measuring 25 mm consistent with blood. PLACENTA:  There is no definite placental identified at this early stage in pregnancy.  RIGHT OVARY:  Normal. LEFT OVARY:  Normal. CUL-DE-SAC:  Trace simple fluid within the cul-de-sac            CONCLUSION:   The gestational sac has heterogeneous echotexture within it without definable structure that likely represents hemorrhage or debris within the gestational sac that is located within the lower uterine segment.  This is highly suggestive of impending miscarriage.   LOCATION:  [facility]   Dictated by (CST): Jean-Pierre Peter MD on 2024 at 5:20 PM     Finalized by (CST): Jean-Pierre Peter MD on 2024 at 5:24 PM              MDM          This is a 44-year-old female G1, P0, in vitro fertilization, currently 6 weeks pregnant followed by Dr. Mahin Martinez who presents with bleeding which began yesterday.  Differential includes spontaneous , threatened miscarriage, subchorionic hemorrhage.        Basic labs were obtained.  CBC: White blood cell count 13.5.  Hemoglobin 14.3.  Platelet 256.      Beta quant 39,000, 611  Beta quant was 44,000, 131 on 2024  Blood type A-    Pregnancy ultrasound was obtained and demonstrated gestational sac which has heterogenous echotexture within it without definable structure likely represents hemorrhage or to be within the gestational sac.  It is located in the uterine cyst lower uterine segment.  Highly suggestive of impending miscarriage      Findings were communicated to patient    I was able to get a hold of Dr. Martinez who states that she did not receive RhoGAM previously and to give it.  It was given in the ER.  She is to stop her hormone therapy.  And she can follow-up in the office on Monday.  Return for any heavy bleeding.  Patient is comfortable with this plan.  She was discharged home in good condition with her .      Disposition and Plan     Clinical Impression:  1. Miscarriage (HCC)          Disposition:  Discharge  11/16/2024  8:03 pm    Follow-up:  Juan Hanson, Big[place] KELLY MD  [address]  [place] [address] [phone]    Follow up on 11/18/2024            Medications Prescribed:  Current Discharge Medication List              Supplementary Documentation:

## 2024-11-17 NOTE — DISCHARGE INSTRUCTIONS
Stop hormones  Return if worse.  If you have heavy bleeding greater than 3 pads an hour 3 hours in a row or passing multiple clots.  Tylenol for pain  Follow-up with Dr. Martinez on Monday

## 2024-11-17 NOTE — ED QUICK NOTES
Pt instructed to return to ED for increased and/or heavy bleeding and to f/u with Dr Martinez on Monday. No signs of allergic reaction from Rhogam IV. Card given to patient for home

## 2024-11-18 PROCEDURE — 88262 CHROMOSOME ANALYSIS 15-20: CPT | Performed by: CLINICAL MEDICAL LABORATORY

## 2024-11-18 PROCEDURE — 88305 TISSUE EXAM BY PATHOLOGIST: CPT | Performed by: CLINICAL MEDICAL LABORATORY

## 2024-11-18 PROCEDURE — 81229 CYTOG ALYS CHRML ABNR SNPCGH: CPT | Performed by: CLINICAL MEDICAL LABORATORY

## 2024-11-18 PROCEDURE — 88233 TISSUE CULTURE SKIN/BIOPSY: CPT | Performed by: CLINICAL MEDICAL LABORATORY

## 2024-11-22 ENCOUNTER — LAB REQUISITION (OUTPATIENT)
Dept: LAB | Age: 44
End: 2024-11-22

## 2024-11-22 DIAGNOSIS — N94.89 OTHER SPECIFIED CONDITIONS ASSOCIATED WITH FEMALE GENITAL ORGANS AND MENSTRUAL CYCLE: ICD-10-CM

## 2024-11-22 DIAGNOSIS — O03.9 COMPLETE OR UNSPECIFIED SPONTANEOUS ABORTION WITHOUT COMPLICATION (CMD): ICD-10-CM

## 2024-11-22 DIAGNOSIS — O02.1 MISSED ABORTION (CMD): ICD-10-CM

## 2024-11-25 ENCOUNTER — TELEPHONE (OUTPATIENT)
Dept: OBGYN UNIT | Facility: HOSPITAL | Age: 44
End: 2024-11-25

## 2024-12-10 LAB
ADDITIONAL COMMENTS: NORMAL
CELL GROWTH FIB QL TISS CULTURE: NORMAL
KARYOTYP CVS: NORMAL
KARYOTYP CVS: NORMAL
Lab: NORMAL
Lab: NORMAL

## 2024-12-31 ENCOUNTER — OFFICE VISIT (OUTPATIENT)
Facility: CLINIC | Age: 44
End: 2024-12-31
Payer: COMMERCIAL

## 2024-12-31 ENCOUNTER — TELEPHONE (OUTPATIENT)
Facility: CLINIC | Age: 44
End: 2024-12-31

## 2024-12-31 VITALS
BODY MASS INDEX: 18 KG/M2 | WEIGHT: 116 LBS | HEART RATE: 74 BPM | SYSTOLIC BLOOD PRESSURE: 120 MMHG | DIASTOLIC BLOOD PRESSURE: 70 MMHG

## 2024-12-31 DIAGNOSIS — R79.89 ELEVATED SERUM HCG: ICD-10-CM

## 2024-12-31 DIAGNOSIS — Z87.59 HISTORY OF MISCARRIAGE: Primary | ICD-10-CM

## 2024-12-31 PROCEDURE — 99213 OFFICE O/P EST LOW 20 MIN: CPT | Performed by: STUDENT IN AN ORGANIZED HEALTH CARE EDUCATION/TRAINING PROGRAM

## 2024-12-31 NOTE — TELEPHONE ENCOUNTER
Spoke with patient. Patient was seeing IVF, was pregnant but had a miscarriage.     11/16 - Miscarriage  11/21 - Hysteroscopy    Patient states she had bleeding and spotting for two weeks after procedure.     Patient's HCG had plateaued starting mid December. HCGs have been in the 500s.    12/24 - Patient started bleeding again, it is bright red, patient denies saturating pad front to back every hour but has to change pad a few times a day. Patient is also having cramping.    12/30 - HCG was 520.     Patient is concerned and feels like there is something wrong but the IVF doctor is not addressing her concerns.     Ok by Dr. Rosen to come in and see her today. Scheduled appointment for her to come in.

## 2024-12-31 NOTE — PROGRESS NOTES
[facility]  Obstetrics and Gynecology   History & Physical    Chief complaint:   Chief Complaint   Patient presents with    Other       Post miscarriage from 24. Had hysteroscopy D&C 24.Concern with vaginal bleeding, abdominal cramping and elevated HCG level.         HPI: Viktoria Philip is a 44 year old  with Patient's last menstrual period was 2024.      Patient here for gyn problem - bleeding after miscarriage and HSC. She had an embryo transfer on 10/22. On , was scheduled for US and then started bleeding. At that time, FHT seen and normal. On , started bleeding heavily with severe cramping and passing large clots (size of mabel). Was actively miscarrying. She had an ultrasound done at fertility clinic after this and notable lots of retained tissue. Thus, her [facility] doctor proceeded with HSC for removal on . Following HSC, did bleed relatively heavily for two weeks and then it stopped and had no bleeding. She was getting bHCG measurements which were on track with decreasing. However on , she had some more bleeding and cramping, felt like a period (however her bHCG was still around 500 at that point). A week later, a repeat bHCG was still around 522. She has not had any intercourse since September. She called her IVF clinic and they have a standing order for repeat bHCG on Monday, however she was very concerned and stressed and wanted to be seen sooner to make sure everything is okay. She is still having some bleeding - she did show me her panty liner that was stained with blood (not completely soaked).    bHCG trends as follows:   : 39,611  : 1826  : 640  : 645  : 624  : 559  : 595  : 522    PCP: Sherry Tapia MD    Review of Systems:  Negative except as above.    OB History:  OB History    Para Term  AB Living   1 0 0 0 0 0   SAB IAB Ectopic Multiple Live Births   0 0 0 0 0      # Outcome Date GA Lbr  Vikram/2nd Weight Sex Type Anes PTL Lv   1 Current               Obstetric Comments   IVF. D&C        Meds:  Medications Ordered Prior to Encounter[1]    All:  Allergies[2]    PMH:  Past Medical History:    History of atypical nevus    IBS    Pap smear for cervical cancer screening    negative       PSH:  History reviewed. No pertinent surgical history.    Social History:  Social History     Socioeconomic History    Marital status: Single     Spouse name: Not on file    Number of children: Not on file    Years of education: Not on file    Highest education level: Not on file   Occupational History    Not on file   Tobacco Use    Smoking status: Never    Smokeless tobacco: Never   Vaping Use    Vaping status: Never Used   Substance and Sexual Activity    Alcohol use: Yes     Alcohol/week: 0.0 standard drinks of alcohol     Comment: one or two a week    Drug use: No    Sexual activity: Yes     Partners: Male   Other Topics Concern     Service Not Asked    Blood Transfusions Not Asked    Caffeine Concern Yes     Comment: soda occassional    Occupational Exposure Not Asked    Hobby Hazards Not Asked    Sleep Concern Not Asked    Stress Concern Not Asked    Weight Concern Not Asked    Special Diet Not Asked    Back Care Not Asked    Exercise Yes     Comment: 5-6 times a week    Bike Helmet Not Asked    Seat Belt Yes    Self-Exams Yes   Social History Narrative    Not on file     Social Drivers of Health     Financial Resource Strain: Not on file   Food Insecurity: Not on file   Transportation Needs: Not on file   Stress: Not on file   Housing Stability: Not on file        Family History:  Family History   Problem Relation Age of Onset    Breast Cancer Mother 68    Heart Disorder Mother         mitral valve prolapse    Lipids Mother     LCIS Mother 55    Other (Other) Mother     Lipids Father     Other (hyperlipidemia) Father     Thyroid Disorder Sister     Heart Disorder Brother         MVP, s/p ablation surgery?     Heart Disorder Maternal Grandfather         MI    Diabetes Maternal Grandfather     Other (Other) Maternal Grandfather     Stroke Paternal Grandmother     Skin cancer Paternal Grandfather         dx age unknown    Prostate Cancer Paternal Grandfather         dx age unknown    Breast Cancer Maternal Aunt         dx age 50-60's- no gene mutation    Breast Cancer 2nd occurrence Maternal Aunt 70       PHYSICAL EXAM:     Vitals:    24 1217   BP: 120/70   Pulse: 74   Weight: 116 lb (52.6 kg)       Body mass index is 17.64 kg/m².      Constitutional: well developed, well nourished  Head/Face: normocephalic  Skin/Hair: no unusual rashes or bruises  Extremities: no edema, no cyanosis  Psychiatric:  Oriented to time, place, person and situation. Appropriate mood and affect    Pelvic Exam:  External Genitalia: normal appearance, hair distribution, and no lesions  Urethral Meatus:  normal in size, location, without lesions and prolapse  Bladder:  No fullness, masses or tenderness  Vagina:  Normal appearance no lesions, moderate amount of blood in vaginal vault, this was removed with cotton swabs  Cervix:  Appeared slightly dilated but no lesions or abnormal discharge, after removal of blood clots from vault, very slight oozing noted from os     Assessment & Plan:     Viktoria Philip is a 44 year old      Diagnoses and all orders for this visit:    #History of miscarriage  #Elevated serum hCG  - s/p miscarriage after embryo transfer on   - required HSC removal of POC on   - had bleeding x 2 weeks, then stopped, and now bleeding again   - bHCG initially down-trended appropriately but now plateau around 500  - on exam, no significant bleeding from cervical os after removal of blood clots, no products visualized  - we did discuss in detail the course of her treatment, considerations for why bHCG might not be down to 0, desire to avoid additional interventional procedures if able given she is an  infertility patient, etc. I discussed that my likely plan would be to repeat bHCG as is standing for Monday to see if her current bleeding will remove whatever products are remaining; but if still elevated, would do US/repeat HSC for removal of whatever else might still be present. We did discuss ED precautions, but right now, her bleeding is not heavy enough for us to need to do any intervention at this time. Patient voiced understanding and is in agreement with plan of care. All her questions were answered to the best of my ability.       Anel Rosen MD   [facility] - OBGYN         [1]   Current Outpatient Medications on File Prior to Visit   Medication Sig Dispense Refill    levothyroxine 25 MCG Oral Tab Take 1 tablet (25 mcg total) by mouth daily.      Prenat-Fe Carbonyl-FA-Omega 3 (ONE-A-DAY WOMENS PRENATAL 1) 28-0.8-235 MG Oral Cap       estrogens conjugated 1.25 MG Oral Tab Take 1 mg by mouth daily.      progesterone 100 MG Oral Cap 50 mg  in the morning and 50 mg before bedtime. Gel 2xday.       No current facility-administered medications on file prior to visit.   [2]   Allergies  Allergen Reactions    Gluten PAIN    Benzoyl Peroxide RASH     Other reaction(s): Unknown

## 2024-12-31 NOTE — TELEPHONE ENCOUNTER
Pt recently miscarried and had hysteroscopy preformed by IVF doctor. Since then has experienced heavy bleeding and high HCG numbers. Please advise.

## 2025-01-02 ENCOUNTER — TELEPHONE (OUTPATIENT)
Dept: FAMILY MEDICINE CLINIC | Facility: CLINIC | Age: 45
End: 2025-01-02

## 2025-01-02 DIAGNOSIS — Z00.00 ROUTINE GENERAL MEDICAL EXAMINATION AT A HEALTH CARE FACILITY: Primary | ICD-10-CM

## 2025-01-02 LAB
ADDITIONAL COMMENTS: NORMAL
CELL GROWTH FIB QL TISS CULTURE: NORMAL
GENE ANALYSIS NARR RPT DOC: NORMAL
INTERPRETATION: NORMAL
KARYOTYP CVS: NORMAL
Lab: NORMAL
Lab: NORMAL

## 2025-01-02 NOTE — TELEPHONE ENCOUNTER
Please enter lab orders for the patient's upcoming physical appointment.     Physical scheduled:   Your appointments       Date & Time Appointment Department ([place])    Jan 16, 2025 8:00 AM CST Physical - Established with Anel Rosen MD [address] - OB/GYN ([place] [address])        Jan 28, 2025 1:00 PM CST Physical - Established with Sherry Tapia MD [facility] ([facility])              [address] - OB/GYN  [place] [address]  [address] [place] [id number]  [phone] [address] [place] [address] [place] [id number] [phone]           Preferred lab: [facility] ([facility])     The patient has been notified to complete fasting labs prior to their physical appointment.

## 2025-01-06 LAB
ASR DISCLAIMER: NORMAL
CASE RPRT: NORMAL
CLINICAL INFO: NORMAL
PATH REPORT.FINAL DX SPEC: NORMAL
PATH REPORT.GROSS SPEC: NORMAL

## 2025-01-16 ENCOUNTER — OFFICE VISIT (OUTPATIENT)
Dept: OBGYN CLINIC | Facility: CLINIC | Age: 45
End: 2025-01-16
Payer: COMMERCIAL

## 2025-01-16 ENCOUNTER — LAB ENCOUNTER (OUTPATIENT)
Dept: LAB | Age: 45
End: 2025-01-16
Attending: FAMILY MEDICINE
Payer: COMMERCIAL

## 2025-01-16 VITALS
DIASTOLIC BLOOD PRESSURE: 79 MMHG | HEIGHT: 68 IN | BODY MASS INDEX: 17.7 KG/M2 | SYSTOLIC BLOOD PRESSURE: 114 MMHG | HEART RATE: 71 BPM | WEIGHT: 116.81 LBS

## 2025-01-16 DIAGNOSIS — Z12.31 ENCOUNTER FOR SCREENING MAMMOGRAM FOR MALIGNANT NEOPLASM OF BREAST: ICD-10-CM

## 2025-01-16 DIAGNOSIS — Z00.00 ROUTINE GENERAL MEDICAL EXAMINATION AT A HEALTH CARE FACILITY: ICD-10-CM

## 2025-01-16 DIAGNOSIS — Z12.4 CERVICAL CANCER SCREENING: ICD-10-CM

## 2025-01-16 DIAGNOSIS — Z01.419 ENCOUNTER FOR WELL WOMAN EXAM WITH ROUTINE GYNECOLOGICAL EXAM: Primary | ICD-10-CM

## 2025-01-16 PROBLEM — N97.9 FEMALE INFERTILITY: Status: ACTIVE | Noted: 2025-01-16

## 2025-01-16 LAB
ALBUMIN SERPL-MCNC: 4.3 G/DL (ref 3.2–4.8)
ALBUMIN/GLOB SERPL: 1.8 {RATIO} (ref 1–2)
ALP LIVER SERPL-CCNC: 43 U/L
ALT SERPL-CCNC: 19 U/L
ANION GAP SERPL CALC-SCNC: 7 MMOL/L (ref 0–18)
AST SERPL-CCNC: 21 U/L (ref ?–34)
BILIRUB SERPL-MCNC: 0.7 MG/DL (ref 0.3–1.2)
BUN BLD-MCNC: 15 MG/DL (ref 9–23)
CALCIUM BLD-MCNC: 9.3 MG/DL (ref 8.7–10.6)
CHLORIDE SERPL-SCNC: 105 MMOL/L (ref 98–112)
CHOLEST SERPL-MCNC: 218 MG/DL (ref ?–200)
CO2 SERPL-SCNC: 29 MMOL/L (ref 21–32)
CREAT BLD-MCNC: 0.79 MG/DL
EGFRCR SERPLBLD CKD-EPI 2021: 95 ML/MIN/1.73M2 (ref 60–?)
ERYTHROCYTE [DISTWIDTH] IN BLOOD BY AUTOMATED COUNT: 12.3 %
FASTING PATIENT LIPID ANSWER: YES
FASTING STATUS PATIENT QL REPORTED: YES
GLOBULIN PLAS-MCNC: 2.4 G/DL (ref 2–3.5)
GLUCOSE BLD-MCNC: 93 MG/DL (ref 70–99)
HCT VFR BLD AUTO: 39.6 %
HDLC SERPL-MCNC: 77 MG/DL (ref 40–59)
HGB BLD-MCNC: 13.5 G/DL
LDLC SERPL CALC-MCNC: 131 MG/DL (ref ?–100)
MCH RBC QN AUTO: 32.2 PG (ref 26–34)
MCHC RBC AUTO-ENTMCNC: 34.1 G/DL (ref 31–37)
MCV RBC AUTO: 94.5 FL
NONHDLC SERPL-MCNC: 141 MG/DL (ref ?–130)
OSMOLALITY SERPL CALC.SUM OF ELEC: 293 MOSM/KG (ref 275–295)
PLATELET # BLD AUTO: 207 10(3)UL (ref 150–450)
POTASSIUM SERPL-SCNC: 4 MMOL/L (ref 3.5–5.1)
PROT SERPL-MCNC: 6.7 G/DL (ref 5.7–8.2)
RBC # BLD AUTO: 4.19 X10(6)UL
SODIUM SERPL-SCNC: 141 MMOL/L (ref 136–145)
TRIGL SERPL-MCNC: 57 MG/DL (ref 30–149)
TSI SER-ACNC: 1.85 UIU/ML (ref 0.55–4.78)
VLDLC SERPL CALC-MCNC: 10 MG/DL (ref 0–30)
WBC # BLD AUTO: 3.7 X10(3) UL (ref 4–11)

## 2025-01-16 PROCEDURE — 99396 PREV VISIT EST AGE 40-64: CPT | Performed by: STUDENT IN AN ORGANIZED HEALTH CARE EDUCATION/TRAINING PROGRAM

## 2025-01-16 PROCEDURE — 87624 HPV HI-RISK TYP POOLED RSLT: CPT | Performed by: STUDENT IN AN ORGANIZED HEALTH CARE EDUCATION/TRAINING PROGRAM

## 2025-01-16 PROCEDURE — 88175 CYTOPATH C/V AUTO FLUID REDO: CPT | Performed by: STUDENT IN AN ORGANIZED HEALTH CARE EDUCATION/TRAINING PROGRAM

## 2025-01-16 PROCEDURE — 84443 ASSAY THYROID STIM HORMONE: CPT

## 2025-01-16 PROCEDURE — 85027 COMPLETE CBC AUTOMATED: CPT

## 2025-01-16 PROCEDURE — 80053 COMPREHEN METABOLIC PANEL: CPT

## 2025-01-16 PROCEDURE — 80061 LIPID PANEL: CPT

## 2025-01-16 PROCEDURE — 36415 COLL VENOUS BLD VENIPUNCTURE: CPT

## 2025-01-16 NOTE — PROGRESS NOTES
[facility]  Obstetrics and Gynecology   History & Physical    Chief complaint:   Chief Complaint   Patient presents with    Wellness Visit     Annual Exam   Reviewed Preventative/Wellness form with patient.         HPI: Viktoria Philip is a 44 year old  with Patient's last menstrual period was 2024.      Patient here for well woman exam.     Last seen in December due to miscarriage and persistent bHCG. bHCG is now down-trending, is trending weekly until 0 and then is going to do further work-up with [facility]: thrombophilia labs, karyotype, SIS, genetic counselor, etc.     Otherwise doing well. Bleeding has stopped over 2 weeks now. Is going to do STD testing with [facility] clinic. Last pap , normal. 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 Due for mammogram this March. No breast complaints.     No new medical problems or medications since last visit.     Hx Prior Abnormal Pap: Yes ()  Pap Date: 10/26/22  Pap Result Notes: Negative      PCP: Sherry Tapia MD    Review of Systems:  Constitutional:  Denies fatigue, night sweats, hot flashes  Eyes:  denies blurred or double vision  Cardiovascular:  denies chest pain or palpitations  Respiratory:  denies shortness of breath  Gastrointestinal:  denies heartburn, abdominal pain, diarrhea or constipation  Genitourinary:  denies dysuria, incontinence, abnormal vaginal discharge, vaginal itching  Musculoskeletal:  denies back pain.  Skin/Breast:  Denies any breast pain, lumps, or discharge.   Neurological:  denies headaches, extremity weakness or numbness.  Psychiatric: denies depression or anxiety.  Endocrine:   denies excessive thirst or urination.  Heme/Lymph:  denies history of anemia, easy bruising or bleeding.    OB History:  OB History    Para Term  AB Living   1 0 0 0 1 0   SAB IAB Ectopic Multiple Live Births   1 0 0 0 0      # Outcome Date GA Lbr Vikram/2nd Weight Sex Type Anes PTL Lv   1 SAB                Obstetric Comments   IVF. D&C        Meds:  Medications Ordered Prior to Encounter[1]    All:  Allergies[2]    PMH:  Past Medical History:    Abnormal uterine bleeding    History of atypical nevus    IBS    Pap smear for cervical cancer screening    negative       PSH:  Past Surgical History:   Procedure Laterality Date    D & c  12/21/24       Social History:  Social History     Socioeconomic History    Marital status: Single     Spouse name: Not on file    Number of children: Not on file    Years of education: Not on file    Highest education level: Not on file   Occupational History    Not on file   Tobacco Use    Smoking status: Never    Smokeless tobacco: Never   Vaping Use    Vaping status: Never Used   Substance and Sexual Activity    Alcohol use: Not Currently     Alcohol/week: 1.0 standard drink of alcohol     Types: 1 Glasses of wine per week     Comment: one or two a week    Drug use: No    Sexual activity: Yes     Partners: Male   Other Topics Concern     Service Not Asked    Blood Transfusions Not Asked    Caffeine Concern No    Occupational Exposure Not Asked    Hobby Hazards Not Asked    Sleep Concern Not Asked    Stress Concern No    Weight Concern No    Special Diet No    Back Care Not Asked    Exercise Yes    Bike Helmet Not Asked    Seat Belt Yes    Self-Exams Yes   Social History Narrative    Not on file     Social Drivers of Health     Financial Resource Strain: Not on file   Food Insecurity: Not on file   Transportation Needs: Not on file   Stress: Not on file   Housing Stability: Not on file        Family History:  Family History   Problem Relation Age of Onset    Breast Cancer Mother 68    Heart Disorder Mother         mitral valve prolapse    Lipids Mother     LCIS Mother 55    Other (Other) Mother     Lipids Father     Other (hyperlipidemia) Father     Thyroid Disorder Sister     Heart Disorder Brother         MVP, s/p ablation surgery?    Heart Disorder Maternal Grandfather          MI    Diabetes Maternal Grandfather     Other (Other) Maternal Grandfather     Stroke Paternal Grandmother     Skin cancer Paternal Grandfather         dx age unknown    Prostate Cancer Paternal Grandfather         dx age unknown    Breast Cancer Maternal Aunt         dx age 50-60's- no gene mutation    Breast Cancer 2nd occurrence Maternal Aunt 70       PHYSICAL EXAM:     Vitals:    25 0812   BP: 114/79   Pulse: 71   Weight: 116 lb 12.8 oz (53 kg)   Height: 68\"       Body mass index is 17.76 kg/m².      Constitutional: well developed, well nourished  Head/Face: normocephalic  Lymphatic: no abnormal supraclavicular or axillary adenopathy is noted  Breast: normal without palpable masses, tenderness, asymmetry, nipple discharge, nipple retraction or skin changes  Abdomen:  soft, nontender, nondistended, no masses  Skin/Hair: no unusual rashes or bruises  Extremities: no edema, no cyanosis  Psychiatric:  Oriented to time, place, person and situation. Appropriate mood and affect    Pelvic Exam:  External Genitalia: normal appearance, hair distribution, and no lesions  Urethral Meatus:  normal in size, location, without lesions and prolapse  Bladder:  No fullness, masses or tenderness  Vagina:  Normal appearance without lesions, no abnormal discharge  Cervix:  Normal without tenderness on motion  Uterus: normal in size, contour, position, mobility, without tenderness  Adnexa: normal without masses or tenderness  Perineum: normal  Anus: no hemorrhoids     Assessment & Plan:     Viktoria Philip is a 44 year old      Diagnoses and all orders for this visit:    Encounter for well woman exam with routine gynecological exam    Cervical cancer screening  -     ThinPrep PAP Smear B; Future  -     Hpv High Risk , Thin Prep Collect; Future    Encounter for screening mammogram for malignant neoplasm of breast  -     [facility] SYD 2D+3D SCREENING BILAT (CPT=77067/42000); Future       Normal breast and pelvic  exam    Healthcare maintenance:  Pap: 10/2022 NILM HPV negative - repeat collected today    Contraception: TTC  STD testing: will do with fertility clinic   Mammogram: due 3/2025, order placed  Colonoscopy, age 46  DEXA, age 66        Anel Rosen MD   [facility] - OBGYN       [1]   Current Outpatient Medications on File Prior to Visit   Medication Sig Dispense Refill    Prenat-Fe Carbonyl-FA-Omega 3 (ONE-A-DAY WOMENS PRENATAL 1) 28-0.8-235 MG Oral Cap       estrogens conjugated 1.25 MG Oral Tab Take 1 mg by mouth daily.      progesterone 100 MG Oral Cap 50 mg  in the morning and 50 mg before bedtime. Gel 2xday.      levothyroxine 25 MCG Oral Tab Take 1 tablet (25 mcg total) by mouth daily.       No current facility-administered medications on file prior to visit.   [2]   Allergies  Allergen Reactions    Gluten PAIN    Benzoyl Peroxide RASH     Other reaction(s): Unknown

## 2025-01-17 LAB — HPV E6+E7 MRNA CVX QL NAA+PROBE: NEGATIVE

## 2025-01-21 ENCOUNTER — TELEPHONE (OUTPATIENT)
Facility: CLINIC | Age: 45
End: 2025-01-21

## 2025-01-21 LAB
C TRACH DNA SPEC QL NAA+PROBE: NEGATIVE
N GONORRHOEA DNA SPEC QL NAA+PROBE: NEGATIVE

## 2025-01-21 NOTE — TELEPHONE ENCOUNTER
Spoke with [facility] lab. GC/CHL can be added to the thin prep pap. Will have Dr. Rosen place the order.

## 2025-01-21 NOTE — TELEPHONE ENCOUNTER
Spoke with patient. Discussed pap results. She thought Dr. Rosen was going to run a GC/CHL off the pap smear. Aware I will call the lab to see if it can be added and call her back. Verbalized understanding.

## 2025-01-28 ENCOUNTER — APPOINTMENT (OUTPATIENT)
Age: 45
End: 2025-01-28
Attending: OBSTETRICS & GYNECOLOGY

## 2025-01-28 ENCOUNTER — OFFICE VISIT (OUTPATIENT)
Dept: FAMILY MEDICINE CLINIC | Facility: CLINIC | Age: 45
End: 2025-01-28
Payer: COMMERCIAL

## 2025-01-28 VITALS
HEIGHT: 68 IN | HEART RATE: 74 BPM | BODY MASS INDEX: 18.09 KG/M2 | RESPIRATION RATE: 15 BRPM | WEIGHT: 119.38 LBS | SYSTOLIC BLOOD PRESSURE: 110 MMHG | TEMPERATURE: 98 F | DIASTOLIC BLOOD PRESSURE: 66 MMHG

## 2025-01-28 DIAGNOSIS — Z00.00 ROUTINE GENERAL MEDICAL EXAMINATION AT A HEALTH CARE FACILITY: Primary | ICD-10-CM

## 2025-01-28 PROCEDURE — 99396 PREV VISIT EST AGE 40-64: CPT | Performed by: FAMILY MEDICINE

## 2025-01-28 NOTE — PROGRESS NOTES
HPI:   Viktoria Philip is a 44 year old female who presents for a complete physical exam.   Last pap:   1/2025 with GYN.    Last mammogram:  3/2024  Menses:  /  Contraception:  none  Previous colonoscopy:  No previous  Family hx of breast, ovarian, cervical or colon CA:  no  Immunizations:  Tdap:  2007, Flu:  /, Pneumo:  /, Shingles:  /  Social Hx:  Long term boyfriend   for optum - wellness for employers; also works once day at [employer].     Lab work acceptable.    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  Embryo transfer 10/2024, had miscarriage 11/2024.  Has been following hcg, trending down, but not to 0 yet.      Wt Readings from Last 6 Encounters:   01/28/25 119 lb 6.4 oz (54.2 kg)   01/16/25 116 lb 12.8 oz (53 kg)   12/31/24 116 lb (52.6 kg)   11/16/24 120 lb (54.4 kg)   04/05/24 116 lb 13.5 oz (53 kg)   01/23/24 117 lb 12.8 oz (53.4 kg)     Body mass index is 18.15 kg/m².     Cholesterol, Total (mg/dL)   Date Value   01/16/2025 218 (H)   01/15/2024 209 (H)   11/28/2022 218 (H)   08/11/2010 188   12/15/2008 195   10/23/2007 235 (H)     CHOLESTEROL (mg/dL)   Date Value   04/08/2014 187   03/27/2013 214 (H)     HDL Cholesterol (mg/dL)   Date Value   01/16/2025 77 (H)   01/15/2024 91 (H)   11/28/2022 78 (H)   08/11/2010 89   12/15/2008 84   10/23/2007 76 (H)     HDL CHOL (mg/dL)   Date Value   04/08/2014 81   03/27/2013 74     LDL Cholesterol Calc (mg/dL)   Date Value   08/11/2010 72   12/15/2008 83   10/23/2007 132 (H)     LDL Cholesterol (mg/dL)   Date Value   01/16/2025 131 (H)   01/15/2024 108 (H)   11/28/2022 130 (H)     LDL CHOLESTROL (mg/dL)   Date Value   04/08/2014 92   03/27/2013 122     Triglycerides (mg/dL)   Date Value   08/11/2010 134   12/15/2008 142   10/23/2007 135        Current Outpatient Medications   Medication Sig Dispense Refill    Prenat-Fe Carbonyl-FA-Omega 3 (ONE-A-DAY WOMENS PRENATAL 1) 28-0.8-235 MG Oral Cap         Patient Active Problem List   Diagnosis     Pure hypercholesterolemia    Chronic constipation    Gluten intolerance    Female infertility     Past Medical History:    Abnormal uterine bleeding    History of atypical nevus    IBS    Pap smear for cervical cancer screening    negative      Past Surgical History:   Procedure Laterality Date    D & c  12/21/24      Family History   Problem Relation Age of Onset    Breast Cancer Mother 68    Heart Disorder Mother         mitral valve prolapse    Lipids Mother     LCIS Mother 55    Other (Other) Mother     Lipids Father     Other (hyperlipidemia) Father     Thyroid Disorder Sister     Heart Disorder Brother         MVP, s/p ablation surgery?    Heart Disorder Maternal Grandfather         MI    Diabetes Maternal Grandfather     Other (Other) Maternal Grandfather     Stroke Paternal Grandmother     Skin cancer Paternal Grandfather         dx age unknown    Prostate Cancer Paternal Grandfather         dx age unknown    Breast Cancer Maternal Aunt         dx age 50-60's- no gene mutation    Breast Cancer 2nd occurrence Maternal Aunt 70      Social History:   Social History     Socioeconomic History    Marital status: Single   Tobacco Use    Smoking status: Never    Smokeless tobacco: Never   Vaping Use    Vaping status: Never Used   Substance and Sexual Activity    Alcohol use: Not Currently     Alcohol/week: 1.0 standard drink of alcohol     Types: 1 Glasses of wine per week     Comment: one or two a week    Drug use: No    Sexual activity: Yes     Partners: Male   Other Topics Concern    Caffeine Concern No    Stress Concern No    Weight Concern No    Special Diet No    Exercise Yes     Comment: 5-6 x a week    Seat Belt Yes    Self-Exams Yes        REVIEW OF SYSTEMS:   GENERAL: feels well otherwise  SKIN: denies any unusual skin lesions  EYES:no vision problems  LUNGS: denies shortness of breath  CARDIOVASCULAR: denies chest pain  GI: denies abdominal pain,  No constipation or diarrhea  : denies dysuria,  vaginal discharge or itching  NEURO: denies headaches  PSYCHE: denies depression or anxiety    EXAM:   /66   Pulse 74   Temp 98.3 °F (36.8 °C)   Resp 15   Ht 5' 8\" (1.727 m)   Wt 119 lb 6.4 oz (54.2 kg)   LMP 09/30/2024   BMI 18.15 kg/m²   Body mass index is 18.15 kg/m².   GENERAL: well developed, well nourished,in no apparent distress  SKIN: no rashes,no suspicious lesions  HEENT: atraumatic, normocephalic,throat are clear; dentition good  EYES:PERRLA, EOMI,conjunctiva are clear  NECK: supple,no adenopathy  BREAST:deferred  LUNGS: clear to auscultation  CARDIO: RRR without murmur  GI: good BS's,no masses, HSM or tenderness  :deferred  EXTREMITIES: no edema    ASSESSMENT AND PLAN:   Viktoria Philip is a 44 year old female who presents for a complete physical exam.  Encounter Diagnosis   Name Primary?    Routine general medical examination at a health care facility Yes     Pap and breast exams per GYN.   Labs yearly  Vaccines recommended today:  none  Recommended low fat diet and regular aerobic exercise.    The patient is asked to return for CPX in 1 year or before as needed.  No orders of the defined types were placed in this encounter.      Meds & Refills for this Visit:  Requested Prescriptions      No prescriptions requested or ordered in this encounter       Imaging & Consults:  None

## 2025-02-11 ENCOUNTER — OFFICE VISIT (OUTPATIENT)
Age: 45
End: 2025-02-11
Attending: OBSTETRICS & GYNECOLOGY
Payer: COMMERCIAL

## 2025-02-11 ENCOUNTER — APPOINTMENT (OUTPATIENT)
Age: 45
End: 2025-02-11
Attending: OBSTETRICS & GYNECOLOGY
Payer: COMMERCIAL

## 2025-02-11 DIAGNOSIS — O35.10X0: Primary | ICD-10-CM

## 2025-02-11 NOTE — PROGRESS NOTES
Pt to f/u if desires PRKN testing.    of PRKN have been associated with early-onset Parkinsonism, when inherited in an autosomal recessive fashion (PMID: [id number], [id number]).”    The couple has one more frozen embryo, predicated to be euploid, female through PGT-A at [facility].     Family History:   The couple denied any known family history of birth defects, infertility, recurrent pregnancy loss (>2), or known genetic conditions.     Counseling:   The following information was discussed with Ms. Philip and Mr. Gibbons.     Reproductive Carrier Screening:  There are many testing options for reproductive carrier screening that differ in methodology and the number of conditions included in the screening.  Carrier screening can be performed for one or two conditions (e.g., CF and SMA only) or for more than 500 conditions simultaneously. The sensitivity and specificity of the carrier screening is influenced by the methodology used, the individual's ethnic background, and the individual's family history. Regardless of the size of the carrier screening panel performed, it is not possible to screen for all known Mendelian disorders nor to guarantee the birth of a healthy child.     PRKN is not included in [employer]oresight panel.    PGT-A versus Whole Genome Microarray:  The couple underwent IVF using an embryo that was predicted to be euploid via PGT-A. It was explained that the PGT-A testing predicted that the embryo had two copies of each autosomal chromosome and two sex chromosomes. This testing is intended to identify losses or gains of entire chromosomes. It cannot detect chromosome losses or gains below 10 Mb.     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 Gibbons screened negative for Pompe  disease. Mr. Gibbons is a known carrier of three autosomal recessive conditions, argininosuccinic aciduria, Alstrom syndrome, and NPHS2-related nephrotic syndrome, and Ms. Philip screened negative for these three conditions. Screening negative reduces the chance for someone to be a carrier of that condition but does not completely exclude the risk. Ms. Philip and Mr. Gibbons are considered to have a low risk to have a child together affected with Pompe disease, argininosuccinic aciduria, Alstrom syndrome, and NPHS2-related nephrotic syndrome.     The 6q26 deletion detected in the couple's miscarriage may be de venice or inherited from a parent. The vast majority of chromosomal losses/gains of this size, including this one, are not currently known to be of significance for embryonic viability. There is currently no evidence that this deletion is the cause of the couple's pregnancy loss. Parental testing through [facility] (test code “[id number]) is available to determine if this deletion is inherited or de venice.    Parental Testing (6q26 deletion):  The pros and cons of parental testing for the 6q26 deletion were discussed with the couple.     If neither Ms. Philip nor Mr. Gibbons test positive for the 6q26 deletion, then the deletion is likely to be de venice and the chance for the remaining frozen embryo and for any future embryos to have this deletion is estimated to be low (e.g., <1%).     If Ms. Philip or Mr. Gibbons test positive for the 6q26 deletion, then that individual is predicted to be a carrier of autosomal recessive early-onset Parkinsonism. Carriers of autosomal recessive conditions are unaffected and are not believed to be at-risk to develop symptoms. However, PRKN analysis for the other partner would be indicated to clarify the couple's risk to have a child with autosomal recessive early-onset Parkinsonism. The frequency of PRKN pathogenic variants in the general population is unknown to me.     Summary and  Plan:  Ms. 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 The significance of this deletion was reviewed with the couple.. Specifically, this deletion does not explain the pregnancy loss. It is of significance because the deletion includes the PRKN gene which is associated with autosomal recessive early-onset Parkinsonism. We discussed that this deletion may be inherited from Ms. Philip or Mr. Gibbons and that testing is available to clarify this. If Ms. Philip or Mr. Gibbons is confirmed to carry the deletion, then PRKN analysis of the other partner would be available to clarify the couple's risk to have a child with early-onset Parkinsonism.       Ms. Philip and Mr. Gibbons voiced their understanding of the above information. They opted not to proceed with additional testing today for the 6q26 deletion nor PRKN analysis today since this will not provide insight into the viability of the remaining embryo. If they change their mind they will follow-up either with me or Dr. Martinez.     Approximately 40 minutes was spent in consultation with Ms. Philip.  Ángel Zhang, a genetic counseling  from [facility] , assisted with today's  consultation.

## 2025-04-01 ENCOUNTER — HOSPITAL ENCOUNTER (OUTPATIENT)
Dept: MAMMOGRAPHY | Age: 45
Discharge: HOME | End: 2025-04-01
Attending: STUDENT IN AN ORGANIZED HEALTH CARE EDUCATION/TRAINING PROGRAM
Payer: COMMERCIAL

## 2025-04-01 DIAGNOSIS — Z12.31 ENCOUNTER FOR SCREENING MAMMOGRAM FOR MALIGNANT NEOPLASM OF BREAST: ICD-10-CM

## 2025-04-01 PROCEDURE — 77067 SCR MAMMO BI INCL CAD: CPT | Performed by: STUDENT IN AN ORGANIZED HEALTH CARE EDUCATION/TRAINING PROGRAM

## 2025-04-01 PROCEDURE — 77063 BREAST TOMOSYNTHESIS BI: CPT | Performed by: STUDENT IN AN ORGANIZED HEALTH CARE EDUCATION/TRAINING PROGRAM

## (undated) NOTE — MR AVS SNAPSHOT
After Visit Summary   11/4/2019    Maryann Sullivan    MRN: [id number]           Visit Information     Date & Time  11/4/2019  3:45 PM Provider  Gina Corral DO [facility]  [address]  Dept.  Phone  [phone]      Your Vi 1. In your Internet browser, go to [email or web address]. [employer]. [employer]  2. Click on the Activate your Account if you have an activation code in the box under the *New User? section. 3. Enter your [employer] Activation Code exactly as it appears below.  You drinks, candies and desserts   Eat plenty of low-fat dairy products High fat meats and dairy   Choose whole grain products Foods high in sodium   Water is best for hydration Fast food.    Eat at home when possible     Tips for increasing your physical activ $73*       VIDEO VISITS  Visit face-to-face with a [facility] physician or ANNA  using your mobile device or computer   using [employer]      e-VISITS  Communicate with a [facility] physician or ANNA  online.   The physician will respond and provide a treatment plan within a

## (undated) NOTE — LETTER
04/12/23    Dear Dr. Christopher Ryamundo,    Vivien Randall is currently under our care for her well woman exams. Her mammogram in the past are normal results and a breast ultrasound is not needed at this time. If you have any questions, please call the office.     Sincerely,    Alyssa CAMERON OB/GYN APN

## (undated) NOTE — MR AVS SNAPSHOT
After Visit Summary   6/28/2021    Marin Diehl    MRN: [id number]           Visit Information     Date & Time  6/28/2021 12:30 PM Provider  Alejandro Gamble DO [facility]  [address]  Dept.  Phone  [phone]      You appointment, please call Kemar Dorsey Scheduling at [phone]. [facility] now offers Video Visits through [address] for adult and pediatric patients.   Video Visits are available Monday - Friday for many common conditions such as aller Monday – Friday  10:00 am – 10:00 pm   Saturday – Sunday  10:00 am – 4:00 pm     [address]   Monday – Friday  4:00 pm – 10:00 pm   Saturday – Sunday  10:00 am – 4:00 pm  WALK-IN CARE  Emergency Medicine Providers  Conditions needing urgent attention, but

## (undated) NOTE — MR AVS SNAPSHOT
[facility] [place]  [address] [facility] [address]  [phone]               Thank you for choosing us for your health care visit with KIANNA Quintanilla. We are glad to serve you and happy to provide you with this summary of your visit.   Ple If you have questions, you can call [phone] to talk to our [facility] Staff. Remember, [facility] is NOT to be used for urgent needs. For medical emergencies, dial 911.            Visit [facility] online at  [email or web address]

## (undated) NOTE — MR AVS SNAPSHOT
After Visit Summary   8/4/2020    Daniel Swain    MRN: [id number]           Visit Information     Date & Time  8/4/2020  1:30 PM Provider  Jonathan Hudson DO Department  [address]  Dept.  Phone  [address] [facility] If you receive a survey from [employer], please take a few minutes to complete it and provide feedback. We strive to deliver the best patient experience and are looking for ways to make improvements. Your feedback will help us do so.  For more information EMERGENCY ROOM Life-threatening emergencies needing immediate intervention at a hospital emergency room.  Average cost  $2,300*   *Cost varies based on your insurance coverage  For more information about hours, locations or appointment options available at